# Patient Record
Sex: MALE | Race: BLACK OR AFRICAN AMERICAN | Employment: FULL TIME | URBAN - METROPOLITAN AREA
[De-identification: names, ages, dates, MRNs, and addresses within clinical notes are randomized per-mention and may not be internally consistent; named-entity substitution may affect disease eponyms.]

---

## 2017-03-15 ENCOUNTER — OFFICE VISIT (OUTPATIENT)
Dept: FAMILY MEDICINE CLINIC | Age: 47
End: 2017-03-15
Payer: COMMERCIAL

## 2017-03-15 VITALS
SYSTOLIC BLOOD PRESSURE: 114 MMHG | DIASTOLIC BLOOD PRESSURE: 80 MMHG | WEIGHT: 232 LBS | HEART RATE: 63 BPM | HEIGHT: 72 IN | BODY MASS INDEX: 31.42 KG/M2

## 2017-03-15 DIAGNOSIS — E66.09 NON MORBID OBESITY DUE TO EXCESS CALORIES: ICD-10-CM

## 2017-03-15 DIAGNOSIS — E78.2 MIXED DYSLIPIDEMIA: Primary | ICD-10-CM

## 2017-03-15 DIAGNOSIS — F17.201 TOBACCO USE DISORDER, MILD, IN SUSTAINED REMISSION: ICD-10-CM

## 2017-03-15 PROCEDURE — 4004F PT TOBACCO SCREEN RCVD TLK: CPT | Performed by: FAMILY MEDICINE

## 2017-03-15 PROCEDURE — G8484 FLU IMMUNIZE NO ADMIN: HCPCS | Performed by: FAMILY MEDICINE

## 2017-03-15 PROCEDURE — G8417 CALC BMI ABV UP PARAM F/U: HCPCS | Performed by: FAMILY MEDICINE

## 2017-03-15 PROCEDURE — G8427 DOCREV CUR MEDS BY ELIG CLIN: HCPCS | Performed by: FAMILY MEDICINE

## 2017-03-15 PROCEDURE — 99213 OFFICE O/P EST LOW 20 MIN: CPT | Performed by: FAMILY MEDICINE

## 2017-03-15 ASSESSMENT — ENCOUNTER SYMPTOMS
SORE THROAT: 0
NAUSEA: 0
DIARRHEA: 0
CONSTIPATION: 0
COUGH: 0
SHORTNESS OF BREATH: 0
ABDOMINAL PAIN: 0
CHEST TIGHTNESS: 0
RHINORRHEA: 0
BACK PAIN: 0
TROUBLE SWALLOWING: 0

## 2017-03-15 ASSESSMENT — PATIENT HEALTH QUESTIONNAIRE - PHQ9
SUM OF ALL RESPONSES TO PHQ9 QUESTIONS 1 & 2: 0
1. LITTLE INTEREST OR PLEASURE IN DOING THINGS: 0
SUM OF ALL RESPONSES TO PHQ QUESTIONS 1-9: 0
2. FEELING DOWN, DEPRESSED OR HOPELESS: 0

## 2017-06-21 ENCOUNTER — OFFICE VISIT (OUTPATIENT)
Dept: FAMILY MEDICINE CLINIC | Age: 47
End: 2017-06-21
Payer: COMMERCIAL

## 2017-06-21 VITALS
TEMPERATURE: 98.4 F | WEIGHT: 238.8 LBS | OXYGEN SATURATION: 97 % | HEIGHT: 72 IN | SYSTOLIC BLOOD PRESSURE: 130 MMHG | DIASTOLIC BLOOD PRESSURE: 70 MMHG | BODY MASS INDEX: 32.34 KG/M2

## 2017-06-21 DIAGNOSIS — J06.9 ACUTE URI: Primary | ICD-10-CM

## 2017-06-21 DIAGNOSIS — J01.90 ACUTE RHINOSINUSITIS: ICD-10-CM

## 2017-06-21 DIAGNOSIS — J20.9 ACUTE LARYNGOTRACHEOBRONCHITIS: ICD-10-CM

## 2017-06-21 PROCEDURE — 99213 OFFICE O/P EST LOW 20 MIN: CPT | Performed by: FAMILY MEDICINE

## 2017-06-21 PROCEDURE — 4004F PT TOBACCO SCREEN RCVD TLK: CPT | Performed by: FAMILY MEDICINE

## 2017-06-21 PROCEDURE — G8417 CALC BMI ABV UP PARAM F/U: HCPCS | Performed by: FAMILY MEDICINE

## 2017-06-21 PROCEDURE — G8427 DOCREV CUR MEDS BY ELIG CLIN: HCPCS | Performed by: FAMILY MEDICINE

## 2017-06-21 RX ORDER — PROMETHAZINE HYDROCHLORIDE AND CODEINE PHOSPHATE 6.25; 1 MG/5ML; MG/5ML
5 SYRUP ORAL EVERY 4 HOURS PRN
Qty: 120 ML | Refills: 0 | Status: SHIPPED | OUTPATIENT
Start: 2017-06-21 | End: 2017-06-28

## 2017-06-21 RX ORDER — AZITHROMYCIN 250 MG/1
250 TABLET, FILM COATED ORAL DAILY
Qty: 6 TABLET | Refills: 0 | Status: SHIPPED | OUTPATIENT
Start: 2017-06-21 | End: 2017-06-26

## 2017-06-21 ASSESSMENT — PATIENT HEALTH QUESTIONNAIRE - PHQ9
SUM OF ALL RESPONSES TO PHQ QUESTIONS 1-9: 0
SUM OF ALL RESPONSES TO PHQ9 QUESTIONS 1 & 2: 0
2. FEELING DOWN, DEPRESSED OR HOPELESS: 0
1. LITTLE INTEREST OR PLEASURE IN DOING THINGS: 0

## 2017-06-21 ASSESSMENT — ENCOUNTER SYMPTOMS
TROUBLE SWALLOWING: 0
ABDOMINAL PAIN: 0
SORE THROAT: 1
SINUS PRESSURE: 1
CHEST TIGHTNESS: 1
NAUSEA: 0
SHORTNESS OF BREATH: 1
RHINORRHEA: 1
CONSTIPATION: 0
DIARRHEA: 0
BACK PAIN: 0
COUGH: 1

## 2017-09-19 ENCOUNTER — OFFICE VISIT (OUTPATIENT)
Dept: FAMILY MEDICINE CLINIC | Age: 47
End: 2017-09-19
Payer: COMMERCIAL

## 2017-09-19 VITALS
SYSTOLIC BLOOD PRESSURE: 130 MMHG | HEART RATE: 58 BPM | WEIGHT: 235 LBS | HEIGHT: 72 IN | DIASTOLIC BLOOD PRESSURE: 88 MMHG | BODY MASS INDEX: 31.83 KG/M2

## 2017-09-19 DIAGNOSIS — E66.09 NON MORBID OBESITY DUE TO EXCESS CALORIES: ICD-10-CM

## 2017-09-19 DIAGNOSIS — E78.2 MIXED DYSLIPIDEMIA: Primary | ICD-10-CM

## 2017-09-19 DIAGNOSIS — D75.1 ERYTHROCYTOSIS: ICD-10-CM

## 2017-09-19 DIAGNOSIS — F17.201 TOBACCO USE DISORDER, MILD, IN SUSTAINED REMISSION: ICD-10-CM

## 2017-09-19 DIAGNOSIS — R73.9 HYPERGLYCEMIA: ICD-10-CM

## 2017-09-19 LAB — HBA1C MFR BLD: 5.9 %

## 2017-09-19 PROCEDURE — 83036 HEMOGLOBIN GLYCOSYLATED A1C: CPT | Performed by: FAMILY MEDICINE

## 2017-09-19 PROCEDURE — 99213 OFFICE O/P EST LOW 20 MIN: CPT | Performed by: FAMILY MEDICINE

## 2017-09-19 PROCEDURE — G8427 DOCREV CUR MEDS BY ELIG CLIN: HCPCS | Performed by: FAMILY MEDICINE

## 2017-09-19 PROCEDURE — G8417 CALC BMI ABV UP PARAM F/U: HCPCS | Performed by: FAMILY MEDICINE

## 2017-09-19 PROCEDURE — 4004F PT TOBACCO SCREEN RCVD TLK: CPT | Performed by: FAMILY MEDICINE

## 2017-09-19 ASSESSMENT — ENCOUNTER SYMPTOMS
DIARRHEA: 0
NAUSEA: 0
TROUBLE SWALLOWING: 0
ABDOMINAL PAIN: 0
COUGH: 0
CONSTIPATION: 0
CHEST TIGHTNESS: 0
SHORTNESS OF BREATH: 0
SORE THROAT: 0
RHINORRHEA: 0
BACK PAIN: 0

## 2018-02-05 DIAGNOSIS — E78.2 MIXED DYSLIPIDEMIA: ICD-10-CM

## 2018-02-05 DIAGNOSIS — D75.1 ERYTHROCYTOSIS: ICD-10-CM

## 2018-02-05 LAB
ALBUMIN SERPL-MCNC: 3.9 G/DL
ALP BLD-CCNC: 77 U/L
ALT SERPL-CCNC: 46 U/L
ANION GAP SERPL CALCULATED.3IONS-SCNC: NORMAL MMOL/L
AST SERPL-CCNC: 26 U/L
BASOPHILS ABSOLUTE: 0 /ΜL
BASOPHILS RELATIVE PERCENT: 0 %
BILIRUB SERPL-MCNC: 0.9 MG/DL (ref 0.1–1.4)
BUN BLDV-MCNC: 13 MG/DL
CALCIUM SERPL-MCNC: 9.1 MG/DL
CHLORIDE BLD-SCNC: 104 MMOL/L
CHOLESTEROL, TOTAL: 232 MG/DL
CHOLESTEROL/HDL RATIO: 4.5
CO2: 28 MMOL/L
CREAT SERPL-MCNC: 0.86 MG/DL
EOSINOPHILS ABSOLUTE: 0 /ΜL
EOSINOPHILS RELATIVE PERCENT: 0 %
GFR CALCULATED: 115.3
GLUCOSE BLD-MCNC: 95 MG/DL
HCT VFR BLD CALC: 46.1 % (ref 41–53)
HDLC SERPL-MCNC: 51 MG/DL (ref 35–70)
HEMOGLOBIN: 14.9 G/DL (ref 13.5–17.5)
LDL CHOLESTEROL CALCULATED: 156 MG/DL (ref 0–160)
LYMPHOCYTES ABSOLUTE: 1.32 /ΜL
LYMPHOCYTES RELATIVE PERCENT: 22 %
MCH RBC QN AUTO: 25.2 PG
MCHC RBC AUTO-ENTMCNC: 32.3 G/DL
MCV RBC AUTO: 78 FL
MONOCYTES ABSOLUTE: 0.48 /ΜL
MONOCYTES RELATIVE PERCENT: 8 %
NEUTROPHILS ABSOLUTE: 4.19 /ΜL
NEUTROPHILS RELATIVE PERCENT: ABNORMAL %
PDW BLD-RTO: ABNORMAL %
PLATELET # BLD: 224 K/ΜL
PMV BLD AUTO: ABNORMAL FL
POTASSIUM SERPL-SCNC: 4.5 MMOL/L
RBC # BLD: 5.91 10^6/ΜL
SODIUM BLD-SCNC: 142 MMOL/L
TOTAL PROTEIN: 7.2
TRIGL SERPL-MCNC: 125 MG/DL
VLDLC SERPL CALC-MCNC: 25 MG/DL
WBC # BLD: 5.99 10^3/ML

## 2018-04-13 ENCOUNTER — OFFICE VISIT (OUTPATIENT)
Dept: FAMILY MEDICINE CLINIC | Age: 48
End: 2018-04-13
Payer: COMMERCIAL

## 2018-04-13 VITALS
BODY MASS INDEX: 32.75 KG/M2 | WEIGHT: 241.8 LBS | DIASTOLIC BLOOD PRESSURE: 78 MMHG | HEART RATE: 61 BPM | SYSTOLIC BLOOD PRESSURE: 127 MMHG | OXYGEN SATURATION: 95 % | HEIGHT: 72 IN

## 2018-04-13 DIAGNOSIS — K21.9 GASTROESOPHAGEAL REFLUX DISEASE, ESOPHAGITIS PRESENCE NOT SPECIFIED: Primary | ICD-10-CM

## 2018-04-13 DIAGNOSIS — E66.09 CLASS 1 OBESITY DUE TO EXCESS CALORIES WITHOUT SERIOUS COMORBIDITY WITH BODY MASS INDEX (BMI) OF 32.0 TO 32.9 IN ADULT: ICD-10-CM

## 2018-04-13 DIAGNOSIS — E78.2 MIXED DYSLIPIDEMIA: ICD-10-CM

## 2018-04-13 PROCEDURE — 4004F PT TOBACCO SCREEN RCVD TLK: CPT | Performed by: PHYSICIAN ASSISTANT

## 2018-04-13 PROCEDURE — G8427 DOCREV CUR MEDS BY ELIG CLIN: HCPCS | Performed by: PHYSICIAN ASSISTANT

## 2018-04-13 PROCEDURE — 99213 OFFICE O/P EST LOW 20 MIN: CPT | Performed by: PHYSICIAN ASSISTANT

## 2018-04-13 PROCEDURE — G8417 CALC BMI ABV UP PARAM F/U: HCPCS | Performed by: PHYSICIAN ASSISTANT

## 2018-04-13 RX ORDER — OMEPRAZOLE 20 MG/1
20 CAPSULE, DELAYED RELEASE ORAL DAILY
Qty: 30 CAPSULE | Refills: 1 | Status: SHIPPED | OUTPATIENT
Start: 2018-04-13

## 2018-04-13 ASSESSMENT — ENCOUNTER SYMPTOMS
ABDOMINAL PAIN: 1
VOMITING: 0
SHORTNESS OF BREATH: 0
NAUSEA: 0
BACK PAIN: 0
CONSTIPATION: 0
DIARRHEA: 0

## 2018-04-14 PROBLEM — J06.9 ACUTE URI: Status: RESOLVED | Noted: 2017-06-21 | Resolved: 2018-04-14

## 2018-04-14 PROBLEM — J01.90 ACUTE RHINOSINUSITIS: Status: RESOLVED | Noted: 2017-06-21 | Resolved: 2018-04-14

## 2018-04-14 PROBLEM — J20.9 ACUTE LARYNGOTRACHEOBRONCHITIS: Status: RESOLVED | Noted: 2017-06-21 | Resolved: 2018-04-14

## 2018-09-21 ENCOUNTER — OFFICE VISIT (OUTPATIENT)
Dept: FAMILY MEDICINE CLINIC | Age: 48
End: 2018-09-21
Payer: COMMERCIAL

## 2018-09-21 VITALS
WEIGHT: 234 LBS | HEIGHT: 72 IN | OXYGEN SATURATION: 96 % | BODY MASS INDEX: 31.69 KG/M2 | SYSTOLIC BLOOD PRESSURE: 106 MMHG | DIASTOLIC BLOOD PRESSURE: 66 MMHG | HEART RATE: 56 BPM

## 2018-09-21 DIAGNOSIS — E11.9 TYPE 2 DIABETES MELLITUS WITHOUT COMPLICATION, WITHOUT LONG-TERM CURRENT USE OF INSULIN (HCC): ICD-10-CM

## 2018-09-21 DIAGNOSIS — E66.09 CLASS 1 OBESITY DUE TO EXCESS CALORIES WITHOUT SERIOUS COMORBIDITY WITH BODY MASS INDEX (BMI) OF 31.0 TO 31.9 IN ADULT: ICD-10-CM

## 2018-09-21 DIAGNOSIS — Z00.00 ANNUAL PHYSICAL EXAM: Primary | ICD-10-CM

## 2018-09-21 DIAGNOSIS — E78.2 MIXED DYSLIPIDEMIA: ICD-10-CM

## 2018-09-21 DIAGNOSIS — K21.9 GASTROESOPHAGEAL REFLUX DISEASE WITHOUT ESOPHAGITIS: ICD-10-CM

## 2018-09-21 LAB — HBA1C MFR BLD: 6.6 %

## 2018-09-21 PROCEDURE — 83036 HEMOGLOBIN GLYCOSYLATED A1C: CPT | Performed by: PHYSICIAN ASSISTANT

## 2018-09-21 PROCEDURE — 99396 PREV VISIT EST AGE 40-64: CPT | Performed by: PHYSICIAN ASSISTANT

## 2018-09-21 ASSESSMENT — ENCOUNTER SYMPTOMS
CONSTIPATION: 0
HEMATOCHEZIA: 0
ABDOMINAL PAIN: 0
DIARRHEA: 1

## 2018-09-21 NOTE — PROGRESS NOTES
[address]  [place]  [address]  [address]  Dept: [phone]  Dept Fax: [phone]    Office Progress/Follow Up Note  Date of patient's visit: 9/21/2018  Patient's Name:  Jaycee Sterling Sr YOB: 1970            Patient Care Team:  Hawk Tolentino PA-C as PCP - General (Physician Assistant)  ================================================================    REASON FOR VISIT/CHIEF COMPLAINT:  Follow-up (5 month follow up)    HISTORY OF PRESENTING ILLNESS:  History was obtained from: patient. Mag Vigil is a 50 y.o. is here for follow-up for GERD, HLD, physical, complaining of GI problem. Patient states she is compliant with all medications. Patient has noticed increased bowel movement daily, increased from twice daily to 4-5 times daily. Patient denies any family history of GI diseases or problems. Patient voice is low concern of increased bowel movement. Discussed increased bowel movements in depth with patient, patient would like to make changes in diet, will contact office if he feels he needs to see GI. Discussed GERD, HLD and up the patient, stable. Patient A1c in office is 6.6. Discussed diabetes, A1c results in depth with patient, patient will like to manage his diabetes with lifestyle changes, discussed diet and weight management in depth with patient. Informed patient we will follow up and recheck A1c in 3 months and if still elevated he will be started on medication, patient voices understanding. Patient blood pressure stable in office. Patient had lost 7 pounds. Discussed weight loss in depth with patient, encouraged patient to make changes in diet and increase physical activity by exercising. Labs reviewed, patient did not have lab work completed, reprinted lab orders and provided to patient, instructed patient have done within the next few weeks, patient voices understanding. Health maintenance reviewed. Medications reviewed. Informed patient there will be no changes to medications for GERD, continue current treatments    GI Problem    The primary symptoms include diarrhea. Primary symptoms do not include fever, abdominal pain, nausea, vomiting, melena, hematochezia, dysuria or myalgias. The illness does not include chills, constipation or back pain. Patient Active Problem List   Diagnosis    Physical exam, annual    Class 1 obesity due to excess calories without serious comorbidity with body mass index (BMI) of 32.0 to 32.9 in adult    Tobacco use disorder    Astigmatism    Presbyopia    Mixed dyslipidemia    Hyperglycemia    Tobacco use disorder, mild, in sustained remission       Health Maintenance Due   Topic Date Due    HIV screen  02/04/1985       No Known Allergies      Current Outpatient Prescriptions   Medication Sig Dispense Refill    omeprazole (PRILOSEC) 20 MG delayed release capsule Take 1 capsule by mouth daily 30 capsule 1     No current facility-administered medications for this visit. Social History   Substance Use Topics    Smoking status: Former Smoker     Types: Cigars    Smokeless tobacco: Never Used      Comment: SMOKES CIGARS    Alcohol use 1.2 oz/week     2 Cans of beer per week      Comment: SOCIAL       History reviewed. No pertinent family history. REVIEW OF SYSTEMS:  Review of Systems   Constitutional: Negative for chills and fever. HENT: Negative for congestion, hearing loss and sore throat. Eyes: Negative for blurred vision and double vision. Respiratory: Negative for cough, sputum production and shortness of breath. Cardiovascular: Negative for chest pain, palpitations and leg swelling. Gastrointestinal: Positive for diarrhea. Negative for abdominal pain, constipation, hematochezia, melena, nausea and vomiting. Genitourinary: Negative for dysuria, frequency and urgency.    Musculoskeletal: Negative for back pain, joint pain, myalgias and neck pain. Neurological: Negative for dizziness, sensory change and headaches. PHYSICAL EXAM:  Vitals:    09/21/18 0910   BP: 106/66   Pulse: 56   SpO2: 96%   Weight: 234 lb (106.1 kg)   Height: 6' (1.829 m)     BP Readings from Last 3 Encounters:   09/21/18 106/66   04/13/18 127/78   09/19/17 130/88        Physical Exam   Constitutional: He is oriented to person, place, and time and well-developed, well-nourished, and in no distress. HENT:   Head: Normocephalic and atraumatic. Mouth/Throat: Oropharynx is clear and moist.   Eyes: Pupils are equal, round, and reactive to light. Conjunctivae and lids are normal.   Neck: Normal range of motion. Cardiovascular: Regular rhythm and normal heart sounds. Bradycardia present. Pulmonary/Chest: Effort normal and breath sounds normal.   Abdominal: Soft. He exhibits no mass. There is no tenderness. Musculoskeletal: He exhibits no edema or tenderness. Neurological: He is alert and oriented to person, place, and time. Gait normal.   Skin: Skin is warm and dry. Psychiatric: Mood and affect normal.   Vitals reviewed. DIAGNOSTIC FINDINGS:  CBC:  Lab Results   Component Value Date    WBC 5.99 01/31/2018    HGB 14.9 01/31/2018     01/31/2018       BMP:    Lab Results   Component Value Date     01/31/2018    K 4.5 01/31/2018     01/31/2018    CO2 28 01/31/2018    BUN 13 01/31/2018    CREATININE 0.86 01/31/2018    GLUCOSE 95 01/31/2018       HEMOGLOBIN A1C:   Lab Results   Component Value Date    LABA1C 6.6 09/21/2018       FASTING LIPID PANEL:  Lab Results   Component Value Date    CHOL 232 (H) 01/31/2018    HDL 51 01/31/2018    TRIG 125 01/31/2018       ASSESSMENT AND PLAN:  Roger Zimmerman was seen today for follow-up.     Diagnoses and all orders for this visit:    Annual physical exam    Type 2 diabetes mellitus without complication, without long-term current use of insulin ([facility] [address]    Mixed dyslipidemia  -     POCT glycosylated hemoglobin (Hb A1C)    Gastroesophageal reflux disease without esophagitis    Class 1 obesity due to excess calories without serious comorbidity with body mass index (BMI) of 31.0 to 31.9 in adult      FOLLOW UP AND INSTRUCTIONS:  Return in about 3 months (around 12/21/2018) for HTN, HLD, GERD, Obesity, Lab Review. · Stacey AGUILAR received counseling on the following healthy behaviors: nutrition and exercise    · Discussed use, benefit, and side effects of prescribed medications. Barriers to medication compliance addressed. All patient questions answered. Pt voiced understanding. · Patient given educational materials - see patient instructions    Electronically signed by Brigida Martinez PA-C on 9/21/18 at 9:22 AM    This note is created with the assistance of a speech-recognition program. While intending to generate a document that actually reflects the content of the visit, the document can still have some mistakes which may not have been identified and corrected by editing.

## 2018-09-22 ASSESSMENT — ENCOUNTER SYMPTOMS
SHORTNESS OF BREATH: 0
VOMITING: 0
SPUTUM PRODUCTION: 0
COUGH: 0
BACK PAIN: 0
DOUBLE VISION: 0
NAUSEA: 0
SORE THROAT: 0
BLURRED VISION: 0

## 2018-09-24 LAB
CHOLESTEROL, TOTAL: 230 MG/DL
CHOLESTEROL/HDL RATIO: 4.6
HDLC SERPL-MCNC: 50 MG/DL (ref 35–70)
LDL CHOLESTEROL CALCULATED: 163 MG/DL (ref 0–160)
TRIGL SERPL-MCNC: 87 MG/DL
VLDLC SERPL CALC-MCNC: 17 MG/DL

## 2018-09-26 ENCOUNTER — TELEPHONE (OUTPATIENT)
Dept: FAMILY MEDICINE CLINIC | Age: 48
End: 2018-09-26

## 2018-09-26 DIAGNOSIS — E78.2 MIXED HYPERLIPIDEMIA: Primary | ICD-10-CM

## 2018-09-26 DIAGNOSIS — E78.2 MIXED DYSLIPIDEMIA: ICD-10-CM

## 2018-09-26 RX ORDER — ATORVASTATIN CALCIUM 40 MG/1
40 TABLET, FILM COATED ORAL NIGHTLY
Qty: 30 TABLET | Refills: 2 | Status: SHIPPED | OUTPATIENT
Start: 2018-09-26 | End: 2019-01-18 | Stop reason: SDUPTHER

## 2018-10-26 ENCOUNTER — OFFICE VISIT (OUTPATIENT)
Dept: FAMILY MEDICINE CLINIC | Age: 48
End: 2018-10-26
Payer: COMMERCIAL

## 2018-10-26 VITALS
WEIGHT: 238.8 LBS | HEART RATE: 97 BPM | SYSTOLIC BLOOD PRESSURE: 123 MMHG | DIASTOLIC BLOOD PRESSURE: 67 MMHG | RESPIRATION RATE: 20 BRPM | TEMPERATURE: 98.1 F | HEIGHT: 72 IN | BODY MASS INDEX: 32.34 KG/M2 | OXYGEN SATURATION: 98 %

## 2018-10-26 DIAGNOSIS — E66.09 CLASS 1 OBESITY DUE TO EXCESS CALORIES WITHOUT SERIOUS COMORBIDITY WITH BODY MASS INDEX (BMI) OF 32.0 TO 32.9 IN ADULT: ICD-10-CM

## 2018-10-26 DIAGNOSIS — M54.42 ACUTE LEFT-SIDED LOW BACK PAIN WITH LEFT-SIDED SCIATICA: Primary | ICD-10-CM

## 2018-10-26 PROCEDURE — G8417 CALC BMI ABV UP PARAM F/U: HCPCS | Performed by: PHYSICIAN ASSISTANT

## 2018-10-26 PROCEDURE — G8484 FLU IMMUNIZE NO ADMIN: HCPCS | Performed by: PHYSICIAN ASSISTANT

## 2018-10-26 PROCEDURE — G8427 DOCREV CUR MEDS BY ELIG CLIN: HCPCS | Performed by: PHYSICIAN ASSISTANT

## 2018-10-26 PROCEDURE — 99213 OFFICE O/P EST LOW 20 MIN: CPT | Performed by: PHYSICIAN ASSISTANT

## 2018-10-26 PROCEDURE — 1036F TOBACCO NON-USER: CPT | Performed by: PHYSICIAN ASSISTANT

## 2018-10-26 RX ORDER — TIZANIDINE 4 MG/1
4 TABLET ORAL 2 TIMES DAILY PRN
Qty: 60 TABLET | Refills: 0 | Status: SHIPPED | OUTPATIENT
Start: 2018-10-26

## 2018-10-26 RX ORDER — IBUPROFEN 800 MG/1
800 TABLET ORAL EVERY 6 HOURS PRN
Qty: 120 TABLET | Refills: 0 | Status: SHIPPED | OUTPATIENT
Start: 2018-10-26

## 2018-10-26 ASSESSMENT — ENCOUNTER SYMPTOMS
BACK PAIN: 1
BOWEL INCONTINENCE: 0

## 2018-10-26 ASSESSMENT — PATIENT HEALTH QUESTIONNAIRE - PHQ9
SUM OF ALL RESPONSES TO PHQ9 QUESTIONS 1 & 2: 0
SUM OF ALL RESPONSES TO PHQ QUESTIONS 1-9: 0
1. LITTLE INTEREST OR PLEASURE IN DOING THINGS: 0
2. FEELING DOWN, DEPRESSED OR HOPELESS: 0
SUM OF ALL RESPONSES TO PHQ QUESTIONS 1-9: 0

## 2018-10-26 NOTE — PROGRESS NOTES
[address]  [place]  [address] [address]  Dept: [phone]  Dept Fax: [phone]    Office Progress/Follow Up Note  Date of patient's visit: 10/26/2018  Patient's Name:  Nesha Bowser Sr YOB: 1970            Patient Care Team:  Shania Hammond PA-C as PCP - General (Physician Assistant)  ================================================================    REASON FOR VISIT/CHIEF COMPLAINT:  Back Pain (x 4 days, pain radiating down left leg)    HISTORY OFPRESENTING ILLNESS:  History was obtained from: patient. Ja Patricio is a 50 y.o. is here complaining of back pain. Patient denies any injury to his lower back. Patient states pain been present for the last 7 days. Patient states he was over as father's house in use his father's inverter, mild improvement for lower back pain. Discussed lower back pain, medications and treatments in depth with patient, patient was provided lower back exercises and stretches, informed patient he will be sent for lumbar spine x-ray, informed patient he will be prescribed pain medication and muscle relaxer, informed patient if lumbar spine x-ray is negative patient will be referred to PT, patient prefers PDT at [facility] location. Patient blood pressure stable in office. Patient weight is stable. Discussed weight loss and that with patient, encouraged patient to make changes in diet and increased physical activity by exercising, informed patient that weight loss will help improve lower back pain. Labs reviewed. Health maintenance reviewed. Medications reviewed, prescribed ibuprofen 800 mg every 6 hours when necessary, Zanaflex 4 mg twice a day when necessary. Back Pain    This is a new problem. The current episode started in the past 7 days. The pain is present in the lumbar spine. The pain radiates to the right thigh. The pain is at a severity of 3/10.  The symptoms are aggravated by twisting and incontinence, dysuria, frequency and urgency. Musculoskeletal: Positive for back pain. Negative for myalgias and neck pain. Neurological: Negative for tingling, weakness, numbness, headaches and paresthesias. PHYSICAL EXAM:  Vitals:    10/26/18 1059   BP: 123/67   Site: Left Upper Arm   Position: Sitting   Cuff Size: Large Adult   Pulse: 97   Resp: 20   Temp: 98.1 °F (36.7 °C)   TempSrc: Oral   SpO2: 98%   Weight: 238 lb 12.8 oz (108.3 kg)   Height: 6' (1.829 m)     BP Readings from Last 3 Encounters:   10/26/18 123/67   09/21/18 106/66   04/13/18 127/78        Physical Exam   Constitutional: He is oriented to person, place, and time. Vital signs are normal.   HENT:   Head: Normocephalic and atraumatic. Eyes: Pupils are equal, round, and reactive to light. Cardiovascular: Normal rate, regular rhythm and normal heart sounds. Pulmonary/Chest: Effort normal and breath sounds normal.   Abdominal: Soft. He exhibits no mass. There is no tenderness. Musculoskeletal:        Lumbar back: He exhibits tenderness, bony tenderness and pain. Neurological: He is alert and oriented to person, place, and time. Skin: Skin is warm and dry. Vitals reviewed. DIAGNOSTIC FINDINGS:  CBC:  Lab Results   Component Value Date    WBC 5.99 01/31/2018    HGB 14.9 01/31/2018     01/31/2018       BMP:    Lab Results   Component Value Date     01/31/2018    K 4.5 01/31/2018     01/31/2018    CO2 28 01/31/2018    BUN 13 01/31/2018    CREATININE 0.86 01/31/2018    GLUCOSE 95 01/31/2018       HEMOGLOBIN A1C:   Lab Results   Component Value Date    LABA1C 6.6 09/21/2018       FASTING LIPID [email or web address]    ASSESSMENT AND PLAN:  Jose AGUILAR was seen today for back pain. Diagnoses and all orders for this visit:    Acute left-sided low back pain with left-sided sciatica  -     XR LUMBAR SPINE (2-3 VIEWS); Future  -     ibuprofen (ADVIL;MOTRIN) 800 MG tablet;  Take 1

## 2018-10-27 ASSESSMENT — ENCOUNTER SYMPTOMS
COUGH: 0
CONSTIPATION: 0
DIARRHEA: 0
NAUSEA: 0
VOMITING: 0
SHORTNESS OF BREATH: 0

## 2018-10-30 DIAGNOSIS — M54.42 ACUTE LEFT-SIDED LOW BACK PAIN WITH LEFT-SIDED SCIATICA: ICD-10-CM

## 2018-10-31 ENCOUNTER — TELEPHONE (OUTPATIENT)
Dept: FAMILY MEDICINE CLINIC | Age: 48
End: 2018-10-31

## 2018-10-31 DIAGNOSIS — M54.50 BILATERAL LOW BACK PAIN WITHOUT SCIATICA, UNSPECIFIED CHRONICITY: Primary | ICD-10-CM

## 2018-11-09 ENCOUNTER — HOSPITAL ENCOUNTER (OUTPATIENT)
Dept: PHYSICAL THERAPY | Facility: CLINIC | Age: 48
Setting detail: THERAPIES SERIES
Discharge: HOME | End: 2018-11-09
Payer: COMMERCIAL

## 2018-11-09 NOTE — FLOWSHEET NOTE
[] [address]  [address]    [phone]  F: [phone] [x] [address]  [facility] [address]   [address]  P: [phone]  F: [phone] [] [place]  [address] [facility]  P: [phone]  F: [phone] [] [address] [facility]   [address]   [place]: [phone]  F: [phone] [] [place] [address] [facility] [address]  [place]: [phone]   F: [phone]      Physical Therapy Cancel/No Show note    Date: 2018  Patient: Paolo Silverio Al  : 1970  MRN: [id number]    Cancels/No Shows to date:     For today's appointment patient:  [x]  Cancelled  [x]  Rescheduled appointment for 18 at 8:00am  []  No-show     Reason given by patient:  []  Patient ill  []  Conflicting appointment  []  No transportation    [x]  Conflict with work  []  No reason given  []  Weather related  []  Other:      Comments:      []  Next appointment was confirmed    Electronically signed by: Young Jasmine PT

## 2018-11-14 ENCOUNTER — HOSPITAL ENCOUNTER (OUTPATIENT)
Dept: PHYSICAL THERAPY | Facility: CLINIC | Age: 48
Setting detail: THERAPIES SERIES
Discharge: HOME | End: 2018-11-14
Payer: COMMERCIAL

## 2018-11-14 PROCEDURE — 97162 PT EVAL MOD COMPLEX 30 MIN: CPT

## 2018-11-14 PROCEDURE — 97530 THERAPEUTIC ACTIVITIES: CPT

## 2018-11-14 NOTE — PLAN OF CARE
bed  [x] Postural Deviations: pelvic/sacral anomalies; increased lumbar lordosis; pes planus  [] Gait Deviations  [x] Other: mm spasms                            STG: (to be met in 6 treatments)  1. ? Pain: below 2-3/10 pain with infrequent LE symptoms and less reliance on medications  2. ? ROM: tolerate hip flexor stretches, general lumbar stretches to help decrease # of spasms pt experiences and to decrease stress to lumbar spine so that he is able to lie in bed without fear of spasm  3. ? Strength: tolerate aggressive spinal stabilization program to address core endurance issues without increase in pain so that the pt is able to work without fear of increased pain  4. ? Function: report ability to sleep in bed with meds if needed  5. Independent with Home Exercise Program  6. Minimize ischial slip and sacral positions     LTG: (to be met in 12  treatments)  1. Pain below 2/10 with minimal to 0 LE symptoms  2. Be (I) with proper body mechanics and use of lumbar roll for proper support  3. Good spinal and pelvic alignment to decrease amount of spasms and LE pain  4.  Excellent core strength for improved posture and decreased spasms/pain     Patient goals:  \"find a way to stop the demobilization\"      Treatment Plan:  [x] Therapeutic Exercise             [] Modalities:  [x] Therapeutic Activity               [] Ultrasound                  [x] Electrical Stimulation  [] Gait Training                          []Massage         [x] Lumbar/Cervical Traction  [] Neuromuscular Re-education            [x] hotpack          [] Iontophoresis: 4 mg/mL  [x] Instruction in HEP                                                                       Dexamethasone Sodium  [x] Manual Therapy                                                                          Phosphate 40-80 mAmin  [] Aquatic Therapy                                 [x] Dry Needling  [] Other:     []  Medication allergies reviewed for use of

## 2018-11-20 ENCOUNTER — HOSPITAL ENCOUNTER (OUTPATIENT)
Dept: PHYSICAL THERAPY | Facility: CLINIC | Age: 48
Setting detail: THERAPIES SERIES
Discharge: HOME | End: 2018-11-20
Payer: COMMERCIAL

## 2018-11-20 PROCEDURE — 97110 THERAPEUTIC EXERCISES: CPT

## 2018-11-20 NOTE — FLOWSHEET NOTE
[] Ernesto [facility]       Outpatient Physical        Therapy       [address]       Phone: [phone]       Fax: [phone] [x] [facility] at [address] [facility]       Phone: [phone]       Fax: [phone] [] Jamie. [address] [place] [address]   Phone: [phone]   Fax:  [phone]     Physical Therapy Daily Treatment Note    Date:  2018  Patient Name:  Porter Bhatia    :  1970  MRN: [id number]  Physician: Amol Venegas PA-C                                     Insurance: [facility] [place] [facility]  Medical Diagnosis: bilateral LBP without sciatica                  Rehab Codes: M54.5, M79.1; M62.830; M79.605; R29.3; M40.46; R29.898  Onset Date: 10/20/18               Next 's appt. :   Visit# / total visits: 2/12   Cancels/No Shows: 1/0    Subjective:    Pain:  [] Yes  [x] No  Location: LBP   Pain Rating: (0-10 scale) 0/10  Pain altered Tx:  [x] No  [] Yes  Action:    Comments: pt denies pain at this time.     Objective:  Modalities:   Precautions: severe pes planus with previous surgery; poor ankle movement  Exercises:  Exercise Reps/ Time Weight/ Level Comments   Scifit 8' ML3               Supine B Hip flexor stretch 3x ea 30\"    R leg pull X     Posterior pelvic tilts 10x 10\"    Lstab march  15x ea A    SLR 15x ea A    SKTC 5x ea 5\"    LTR 5x ea 5\"    HS stretch 3x ea 30\"          Sierra rotary torso 15x ea 4pl          Seated stability ball     Large red   march 15x ea A     Alt UE/LE 15x ea A     LAQ 15x ea A    Other:  Therapeutic activities: instructed in use of lumbar roll; shown 2 different types- reviewed 18     Specific Instructions for next treatment: hip flexor stretches; aggressive core stability exercises; R leg pull or L ischium push; MFR lumbar spine/sacrum if needed; lumbar traction (I); DN (once approved- will need to fill out consent)      Treatment Charges: Mins Units

## 2018-11-27 ENCOUNTER — HOSPITAL ENCOUNTER (OUTPATIENT)
Dept: PHYSICAL THERAPY | Facility: CLINIC | Age: 48
Setting detail: THERAPIES SERIES
Discharge: HOME | End: 2018-11-27
Payer: COMMERCIAL

## 2018-11-27 PROCEDURE — 97110 THERAPEUTIC EXERCISES: CPT

## 2018-11-27 NOTE — FLOWSHEET NOTE
[] Aleksandar Borden       Outpatient Physical        Therapy       [address]       Phone: [phone]       Fax: [phone] [x] [facility] at [address] [facility]       Phone: [phone]       Fax: [phone] [] Jamie. [facility]  [address] [place] [address]   Phone: [phone]   Fax:  [phone]     Physical Therapy Daily Treatment Note    Date:  2018  Patient Name:  Hayde Farrar    :  1970  MRN: [id number]  Physician: Caryl Carrero PA-C                                     Insurance: [facility]  Medical Diagnosis: bilateral LBP without sciatica                  Rehab Codes: M54.5, M79.1; M62.830; M79.605; R29.3; M40.46; R29.898  Onset Date: 10/20/18               Next 's appt. :   Visit# / total visits: 3/12   Cancels/No Shows: 1/0    Subjective:    Pain:  [] Yes  [x] No  Location: LBP   Pain Rating: (0-10 scale) 0/10  Pain altered Tx:  [x] No  [] Yes  Action:    Comments: pt denies pain in the lower back at this time, however Pt states that he has some stiffness in the upper back(rhomboid muscles area)     Objective:  Modalities:   Precautions: severe pes planus with previous surgery; poor ankle movement  Exercises:  Exercise Reps/ Time Weight/ Level Comments   Scifit 10' ML3           SUPINE          Supine B Hip flexor stretch 3x ea 30\" Leg off the table   R leg pull X     Posterior pelvic tilts 10x 10\"    Lstab   15x ea 2# Added weight 18   SLR 15x ea 2# Added weight 18   SKTC 5x ea 5\"    LTR 5x ea 5\"    HS stretch 3x ea 30\"          Sierra rotary torso 15x ea 4pl          Seated stability ball    A Large blue    march 15x ea      Alt UE/LE 15x ea     LAQ  15xea           PRONE on stability ball   Added 18   Alt UE 10xea     Alt LE 10x ea      Alt UE/LE 10xea     Other:  Therapeutic activities: instructed in use of lumbar roll; shown 2 different types- reviewed Demonstrates understanding. [] Needs review. [x] Demonstrates/verbalizes HEP/Ed previously given. Plan: [x] Continue per plan of care.    [] Other:      Time In: 5:20 pm              Time Out: 6:10 pm     Electronically signed by:  Corey Danielson PTA

## 2018-11-29 ENCOUNTER — HOSPITAL ENCOUNTER (OUTPATIENT)
Dept: PHYSICAL THERAPY | Facility: CLINIC | Age: 48
Setting detail: THERAPIES SERIES
Discharge: HOME | End: 2018-11-29
Payer: COMMERCIAL

## 2018-11-29 PROCEDURE — 97140 MANUAL THERAPY 1/> REGIONS: CPT

## 2018-11-29 PROCEDURE — 97110 THERAPEUTIC EXERCISES: CPT

## 2018-11-29 NOTE — FLOWSHEET NOTE
support  3. Good spinal and pelvic alignment to decrease amount of spasms and LE pain  4. Excellent core strength for improved posture and decreased spasms/pain     Patient goals:  \"find a way to stop the demobilization\"    Pt. Education:  [x] Yes  [] No  [x] Reviewed Prior HEP/Ed  Method of Education: [x] Verbal  [x] Demo: clinic progression  [] Written  Comprehension of Education:  [x] Verbalizes understanding. [x] Demonstrates understanding. [x] Needs review. [x] Demonstrates/verbalizes HEP/Ed previously given. Plan: [x] Continue per plan of care.    [] Other:      Time In: 6:30 pm              Time Out: 7:15 pm     Electronically signed by:  Malina Pradhan PT

## 2018-12-05 ENCOUNTER — HOSPITAL ENCOUNTER (OUTPATIENT)
Dept: PHYSICAL THERAPY | Facility: CLINIC | Age: 48
Setting detail: THERAPIES SERIES
Discharge: HOME | End: 2018-12-05
Payer: COMMERCIAL

## 2018-12-05 PROCEDURE — 97110 THERAPEUTIC EXERCISES: CPT

## 2018-12-07 ENCOUNTER — HOSPITAL ENCOUNTER (OUTPATIENT)
Dept: PHYSICAL THERAPY | Facility: CLINIC | Age: 48
Setting detail: THERAPIES SERIES
Discharge: HOME | End: 2018-12-07
Payer: COMMERCIAL

## 2018-12-07 PROCEDURE — 97110 THERAPEUTIC EXERCISES: CPT

## 2018-12-07 NOTE — FLOWSHEET NOTE
report ability to sleep in bed with meds if needed MET with out pain meds 12/7  5. Independent with Home Exercise Program MET 12/7  6. Minimize ischial slip and sacral positions     LTG: (to be met in 12  treatments)  1. Pain below 2/10 with minimal to 0 LE symptoms  2. Be (I) with proper body mechanics and use of lumbar roll for proper support  3. Good spinal and pelvic alignment to decrease amount of spasms and LE pain  4. Excellent core strength for improved posture and decreased spasms/pain     Patient goals:  \"find a way to stop the demobilization\"    Pt. Education:  [x] Yes  [] No  [x] Reviewed Prior HEP/Ed  Method of Education: [x] Verbal  [x] Demo: clinic progression  [] Written  Comprehension of Education:  [x] Verbalizes understanding. [x] Demonstrates understanding. [] Needs review. [x] Demonstrates/verbalizes HEP/Ed previously given. Plan: [x] Continue per plan of care.    [] Other:      Time In: 10:25 am              Time Out: 11:30 am     Electronically signed by:  Latricia Pedro PTA

## 2018-12-10 ENCOUNTER — HOSPITAL ENCOUNTER (OUTPATIENT)
Dept: PHYSICAL THERAPY | Facility: CLINIC | Age: 48
Setting detail: THERAPIES SERIES
Discharge: HOME | End: 2018-12-10
Payer: COMMERCIAL

## 2018-12-10 PROCEDURE — 97110 THERAPEUTIC EXERCISES: CPT

## 2018-12-10 NOTE — FLOWSHEET NOTE
[] Taryn Prima       Outpatient Physical        Therapy       [address]       Phone: [phone]       Fax: [phone] [x] [facility] at [address]       Phone: [phone]       Fax: [phone] [] [facility]. [address] [place] [address] Promotion  [address] [place] [address]   Phone: [phone]   Fax:  [phone]     Physical Therapy Daily Treatment Note    Date:  12/10/2018  Patient Name:  Jennifer Desai    :  1970  MRN: [id number]  Physician: Ashlyn Meraz PA-C                                     Insurance: [facility]  Medical Diagnosis: bilateral LBP without sciatica                  Rehab Codes: M54.5, M79.1; M62.830; M79.605; R29.3; M40.46; R29.898  Onset Date: 10/20/18               Next 's appt. :   Visit# / total visits:    Cancels/No Shows:     Subjective:    Pain:  [] Yes  [x] No  Location: LBP   Pain Rating: (0-10 scale) 3/10  Pain altered Tx:  [] No  [x] Yes  Action: Began with stretches    Comments: Pt arrives noting he has some soreness in LB and into right LE. Pt states this is the first real day of work in the last few days. 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      Objective:  Not today: Type I thoracic at T5-6 and T1-4; FRSL at T2; snags to upper thoracic; L downslip correction;  Modalities:   Precautions: severe pes planus with previous surgery; poor ankle movement  Exercises: bolded  Exercise Reps/ Time Weight/ Level Comments   Scifit 10' ML3           SUPINE          Supine B Hip flexor stretch 3x ea 30\" Leg off the table   R leg pull X     Posterior pelvic tilts 10x 10\"    Lstab marches  15x ea 2# Added weight 18   SLR 15x ea 2# Added weight 18   SKTC 5x ea 5\"    LTR 5x ea 5\"    HS stretch 3x ea 30\"          SIERRA      Sierra rotary torso 20 ea 5 pl    Hip flexion 20 4pl Increased 18   Pulleys: B shoulder

## 2018-12-13 ENCOUNTER — HOSPITAL ENCOUNTER (OUTPATIENT)
Dept: PHYSICAL THERAPY | Facility: CLINIC | Age: 48
Setting detail: THERAPIES SERIES
Discharge: HOME | End: 2018-12-13
Payer: COMMERCIAL

## 2018-12-13 NOTE — FLOWSHEET NOTE
[] [address]  [address]    [phone]  F: [phone] [x] [address]  [facility]   [address]  P: [phone]  F: [phone] [] [place]  [address] [facility]  P: [phone]  F: [phone] [] [address]  [address] [facility] [address]   [address]   [place]: [phone]  F: [phone] [] [place] [address] [facility] [address]  [place]: [phone]   F: [phone]      Physical Therapy Cancel/No Show note    Date: 2018  Patient: Supriya Ibrahim Sr  : 1970  MRN: [id number]    Cancels/No Shows to date:     For today's appointment patient:  [x]  Cancelled  []  Rescheduled appointment  []  No-show     Reason given by patient:  []  Patient ill  []  Conflicting appointment  []  No transportation    [x]  Conflict with work  []  No reason given  []  Weather related  []  Other:      Comments:      [x]  Next appointment was confirmed    Electronically signed by: Orlin Hurley PTA

## 2018-12-17 ENCOUNTER — HOSPITAL ENCOUNTER (OUTPATIENT)
Dept: PHYSICAL THERAPY | Facility: CLINIC | Age: 48
Setting detail: THERAPIES SERIES
Discharge: HOME | End: 2018-12-17
Payer: COMMERCIAL

## 2018-12-17 PROCEDURE — 97110 THERAPEUTIC EXERCISES: CPT

## 2018-12-17 NOTE — FLOWSHEET NOTE
[] [facility]       Outpatient Physical        Therapy       [address]       Phone: [phone]       Fax: [phone] [x] [facility] at [address] [facility]       Phone: [phone]       Fax: [phone] [] [place] [address] [place] [address] [facility]  [address] [place] [address]   Phone: [phone]   Fax:  [phone]     Physical Therapy Daily Treatment Note    Date:  2018  Patient Name:  Kolton Nurse    :  1970  MRN: [id number]  Physician: Pratibha Victor PA-C                                     Insurance: [facility]  Medical Diagnosis: bilateral LBP without sciatica                  Rehab Codes: M54.5, M79.1; M62.830; M79.605; R29.3; M40.46; R29.898  Onset Date: 10/20/18               Next 's appt. :   Visit# / total visits:    Cancels/No Shows:     Subjective:    Pain:  [] Yes  [x] No  Location: LBP   Pain Rating: (0-10 scale) 0/10  Pain altered Tx:  [] No  [x] Yes  Action: Began with stretches    Comments: Pt denies any pain at this time      Objective:  Not today: Type I thoracic at T5-6 and T1-4; FRSL at T2; snags to upper thoracic; L downslip correction;  Modalities:   Precautions: severe pes planus with previous surgery; poor ankle movement  Exercises: bolded  Exercise Reps/ Time Weight/ Level Comments   Scifit 10' ML3           SUPINE          Supine B Hip flexor stretch 3x ea 30\" Leg off the table   R leg pull X     Posterior pelvic tilts 10x 10\"    Lstab marches  15x ea 2# Added weight 18   SLR 15x ea 2# Added weight 18   SKTC 5x ea 5\"    LTR 5x ea 5\"    HS stretch 3x ea 30\"          SIERRA      Sierra rotary torso 20 ea 5 pl    Hip flexion 20 4pl Increased 18   Pulleys: B shoulder adduction 20 3 With ta contraction; started 18   Pulleys: arm flex/ext with core contraction 20 Ext:2 flex: 2 Started 18   Spine extension 20x2 6pl Increased 18         Seated spasms and LE pain  4. Excellent core strength for improved posture and decreased spasms/pain     Patient goals:  \"find a way to stop the demobilization\"    Pt. Education:  [] Yes  [x] No  [] Reviewed Prior HEP/Ed  Method of Education: [] Verbal  [] Demo: clinic progression  [] Written  Comprehension of Education:  [] Verbalizes understanding. [] Demonstrates understanding. [] Needs review. [] Demonstrates/verbalizes HEP/Ed previously given. Plan: [x] Continue per plan of care.    [] Other:      Time In: 5:40pm              Time Out: 6:50pm     Electronically signed by:  Katherin Stern PTA

## 2018-12-20 ENCOUNTER — HOSPITAL ENCOUNTER (OUTPATIENT)
Dept: PHYSICAL THERAPY | Facility: CLINIC | Age: 48
Setting detail: THERAPIES SERIES
Discharge: HOME | End: 2018-12-20
Payer: COMMERCIAL

## 2018-12-26 ENCOUNTER — HOSPITAL ENCOUNTER (OUTPATIENT)
Dept: PHYSICAL THERAPY | Facility: CLINIC | Age: 48
Setting detail: THERAPIES SERIES
Discharge: HOME | End: 2018-12-26
Payer: COMMERCIAL

## 2018-12-26 NOTE — FLOWSHEET NOTE
[] [address]  [address]    [phone]  F: [phone] [x] [address]  [facility]   [address]  P: [phone]  F: [phone] [] [place]  [address]  P: [phone]  F: [phone] [] [address] [facility]   [address]   [facility]: [phone]  F: [phone] [] [facility]  [address]  [facility]: [phone]   F: [phone]      Physical Therapy Cancel/No Show note    Date: 2018  Patient: Marilee Leventhal Sr  : 1970  MRN: [id number]    Cancels/No Shows to date:     For today's appointment patient:  [x]  Cancelled  and 18  []  Rescheduled appointment  []  No-show     Reason given by patient:  []  Patient ill  []  Conflicting appointment  []  No transportation    []  Conflict with work  []  No reason given  []  Weather related  [x]  Other: death in family     Comments:      [x]  Pt to contact us when ready to return to therapy    Electronically signed by: Malina Pradhan, PT

## 2018-12-27 ENCOUNTER — APPOINTMENT (OUTPATIENT)
Dept: PHYSICAL THERAPY | Facility: CLINIC | Age: 48
End: 2018-12-27
Payer: COMMERCIAL

## 2019-01-18 ENCOUNTER — OFFICE VISIT (OUTPATIENT)
Dept: PRIMARY CARE CLINIC | Age: 49
End: 2019-01-18
Payer: COMMERCIAL

## 2019-01-18 VITALS
SYSTOLIC BLOOD PRESSURE: 122 MMHG | DIASTOLIC BLOOD PRESSURE: 69 MMHG | HEIGHT: 72 IN | BODY MASS INDEX: 32.51 KG/M2 | OXYGEN SATURATION: 97 % | RESPIRATION RATE: 20 BRPM | WEIGHT: 240 LBS | HEART RATE: 67 BPM | TEMPERATURE: 97.2 F

## 2019-01-18 DIAGNOSIS — E78.2 MIXED HYPERLIPIDEMIA: Primary | ICD-10-CM

## 2019-01-18 DIAGNOSIS — E66.09 CLASS 1 OBESITY DUE TO EXCESS CALORIES WITHOUT SERIOUS COMORBIDITY WITH BODY MASS INDEX (BMI) OF 32.0 TO 32.9 IN ADULT: ICD-10-CM

## 2019-01-18 DIAGNOSIS — M54.41 ACUTE BILATERAL LOW BACK PAIN WITH RIGHT-SIDED SCIATICA: ICD-10-CM

## 2019-01-18 PROCEDURE — G8417 CALC BMI ABV UP PARAM F/U: HCPCS | Performed by: PHYSICIAN ASSISTANT

## 2019-01-18 PROCEDURE — 1036F TOBACCO NON-USER: CPT | Performed by: PHYSICIAN ASSISTANT

## 2019-01-18 PROCEDURE — G8484 FLU IMMUNIZE NO ADMIN: HCPCS | Performed by: PHYSICIAN ASSISTANT

## 2019-01-18 PROCEDURE — 99214 OFFICE O/P EST MOD 30 MIN: CPT | Performed by: PHYSICIAN ASSISTANT

## 2019-01-18 PROCEDURE — G8427 DOCREV CUR MEDS BY ELIG CLIN: HCPCS | Performed by: PHYSICIAN ASSISTANT

## 2019-01-18 RX ORDER — ATORVASTATIN CALCIUM 40 MG/1
40 TABLET, FILM COATED ORAL NIGHTLY
Qty: 30 TABLET | Refills: 4 | Status: SHIPPED | OUTPATIENT
Start: 2019-01-18 | End: 2019-06-28 | Stop reason: SDUPTHER

## 2019-01-18 ASSESSMENT — ENCOUNTER SYMPTOMS
BACK PAIN: 1
COUGH: 1
WHEEZING: 1

## 2019-01-20 ASSESSMENT — ENCOUNTER SYMPTOMS
DIARRHEA: 0
CONSTIPATION: 0
SHORTNESS OF BREATH: 0
VOMITING: 0
NAUSEA: 0

## 2019-01-23 ENCOUNTER — HOSPITAL ENCOUNTER (OUTPATIENT)
Dept: PHYSICAL THERAPY | Facility: CLINIC | Age: 49
Setting detail: THERAPIES SERIES
Discharge: HOME | End: 2019-01-23
Payer: COMMERCIAL

## 2019-01-23 NOTE — DISCHARGE SUMMARY
[] [facility]) [facility] &  Therapy  [address]  [phone]  F: [phone] [x] [address]  [facility]   [address]  P: [phone]  F: [phone] [] [place]  [address]  P: [phone]  F: [phone] [] [address]  [facility]   [address]   [place]: [phone]  F: [phone]     Physical Therapy Discharge Note    Date: 2019      Patient: Giovanny Contreras Sr  : 1970  MRN: [id number]    Physician: John London PA-C                                     Insurance: [facility]  Medical Diagnosis: bilateral LBP without sciatica                  Rehab Codes: M54.5, M79.1; M62.830; M79.605; R29.3; M40.46; R29.898  Onset Date: 10/20/18               Next 's appt. :   Visit# / total visits:                                   Cancels/No Shows: 3/2    Date of initial visit: 18                Date of final visit: 18      Subjective:  Pain:  [] Yes  [x] No                      Location: LBP                          Pain Rating: (0-10 scale) 0/10  Pain altered Tx:  [] No  [x] Yes  Action: Began with stretches     Comments: Pt denies any pain at this time          Assessment: Pt was progressing well with strengthening and core stability exercises; pt would have times of increased pain related to activity level at work that was strenuous.     STG: (to be met in 6 treatments)  1. ? Pain: below 2-3/10 pain with infrequent LE symptoms and less reliance on medications: MET 18  2. ? ROM: tolerate hip flexor stretches, general lumbar stretches to help decrease # of spasms pt experiences and to decrease stress to lumbar spine so that he is able to lie in bed without fear of spasm MET   3. ? Strength: tolerate aggressive spinal stabilization program to address

## 2019-06-28 ENCOUNTER — OFFICE VISIT (OUTPATIENT)
Dept: PRIMARY CARE CLINIC | Age: 49
End: 2019-06-28
Payer: COMMERCIAL

## 2019-06-28 VITALS
TEMPERATURE: 98.2 F | BODY MASS INDEX: 29.93 KG/M2 | HEART RATE: 54 BPM | HEIGHT: 72 IN | OXYGEN SATURATION: 98 % | RESPIRATION RATE: 20 BRPM | DIASTOLIC BLOOD PRESSURE: 73 MMHG | SYSTOLIC BLOOD PRESSURE: 113 MMHG | WEIGHT: 221 LBS

## 2019-06-28 DIAGNOSIS — E66.3 OVERWEIGHT (BMI 25.0-29.9): ICD-10-CM

## 2019-06-28 DIAGNOSIS — E11.65 TYPE 2 DIABETES MELLITUS WITH HYPERGLYCEMIA, UNSPECIFIED WHETHER LONG TERM INSULIN USE (HCC): Primary | ICD-10-CM

## 2019-06-28 DIAGNOSIS — E78.2 MIXED HYPERLIPIDEMIA: ICD-10-CM

## 2019-06-28 LAB — HBA1C MFR BLD: 5.7 %

## 2019-06-28 PROCEDURE — 1036F TOBACCO NON-USER: CPT | Performed by: PHYSICIAN ASSISTANT

## 2019-06-28 PROCEDURE — G8427 DOCREV CUR MEDS BY ELIG CLIN: HCPCS | Performed by: PHYSICIAN ASSISTANT

## 2019-06-28 PROCEDURE — G8417 CALC BMI ABV UP PARAM F/U: HCPCS | Performed by: PHYSICIAN ASSISTANT

## 2019-06-28 PROCEDURE — 99214 OFFICE O/P EST MOD 30 MIN: CPT | Performed by: PHYSICIAN ASSISTANT

## 2019-06-28 PROCEDURE — 83036 HEMOGLOBIN GLYCOSYLATED A1C: CPT | Performed by: PHYSICIAN ASSISTANT

## 2019-06-28 PROCEDURE — 2022F DILAT RTA XM EVC RTNOPTHY: CPT | Performed by: PHYSICIAN ASSISTANT

## 2019-06-28 PROCEDURE — 3044F HG A1C LEVEL LT 7.0%: CPT | Performed by: PHYSICIAN ASSISTANT

## 2019-06-28 RX ORDER — ATORVASTATIN CALCIUM 40 MG/1
40 TABLET, FILM COATED ORAL NIGHTLY
Qty: 30 TABLET | Refills: 5 | Status: SHIPPED | OUTPATIENT
Start: 2019-06-28

## 2019-06-28 ASSESSMENT — PATIENT HEALTH QUESTIONNAIRE - PHQ9
2. FEELING DOWN, DEPRESSED OR HOPELESS: 0
SUM OF ALL RESPONSES TO PHQ QUESTIONS 1-9: 0
1. LITTLE INTEREST OR PLEASURE IN DOING THINGS: 0
SUM OF ALL RESPONSES TO PHQ9 QUESTIONS 1 & 2: 0
SUM OF ALL RESPONSES TO PHQ QUESTIONS 1-9: 0

## 2019-06-28 ASSESSMENT — ENCOUNTER SYMPTOMS: COUGH: 1

## 2019-06-28 NOTE — PROGRESS NOTES
1989       No Known Allergies      Current Outpatient Medications   Medication Sig Dispense Refill    atorvastatin (LIPITOR) 40 MG tablet Take 1 tablet by mouth nightly 30 tablet 5    ibuprofen (ADVIL;MOTRIN) 800 MG tablet Take 1 tablet by mouth every 6 hours as needed for Pain 120 tablet 0    tiZANidine (ZANAFLEX) 4 MG tablet Take 1 tablet by mouth 2 times daily as needed (Myalgia) 60 tablet 0    omeprazole (PRILOSEC) 20 MG delayed release capsule Take 1 capsule by mouth daily 30 capsule 1     No current facility-administered medications for this visit. Social History     Tobacco Use    Smoking status: Former Smoker     Years: 30.00     Types: Cigars     Last attempt to quit: 2017     Years since quittin.5    Smokeless tobacco: Never Used    Tobacco comment: \"on and off\"   Substance Use Topics    Alcohol use: Yes     Alcohol/week: 1.2 oz     Types: 2 Cans of beer per week     Comment: SOCIAL    Drug use: Not on file       No family history on file. REVIEW OFSYSTEMS:  Review of Systems   Constitutional: Negative for chills and fever. HENT: Negative for congestion. Respiratory: Positive for cough. Negative for shortness of breath and wheezing. Cardiovascular: Negative for chest pain. Gastrointestinal: Negative for constipation, diarrhea, nausea and vomiting. Genitourinary: Negative for dysuria, frequency and urgency. Musculoskeletal: Negative for back pain, myalgias and neck pain. Neurological: Negative for headaches. PHYSICAL EXAM:  Vitals:    19 0953   BP: 113/73   Site: Right Upper Arm   Position: Sitting   Cuff Size: Medium Adult   Pulse: 54   Resp: 20   Temp: 98.2 °F (36.8 °C)   TempSrc: Oral   SpO2: 98%   Weight: 221 lb (100.2 kg)   Height: 6' (1.829 m)     BP Readings from Last 3 Encounters:   19 113/73   19 122/69   10/26/18 123/67        Physical Exam   Constitutional: He is oriented to person, place, and time.  Vital signs are normal. He appears well-developed and well-nourished. He is cooperative. HENT:   Head: Normocephalic and atraumatic. Right Ear: External ear normal.   Left Ear: External ear normal.   Nose: Nose normal.   Eyes: Pupils are equal, round, and reactive to light. Conjunctivae and lids are normal.   Cardiovascular: Normal rate, regular rhythm and normal heart sounds. Pulmonary/Chest: Effort normal and breath sounds normal.   Abdominal: Soft. He exhibits no mass. There is no tenderness. Musculoskeletal: He exhibits no tenderness. Neurological: He is alert and oriented to person, place, and time. Skin: Skin is warm and dry. Vitals reviewed. DIAGNOSTIC FINDINGS:  CBC:  Lab Results   Component Value Date    WBC 5.99 01/31/2018    HGB 14.9 01/31/2018     01/31/2018       BMP:    Lab Results   Component Value Date     01/31/2018    K 4.5 01/31/2018     01/31/2018    CO2 28 01/31/2018    BUN 13 01/31/2018    CREATININE 0.86 01/31/2018    GLUCOSE 95 01/31/2018       HEMOGLOBIN A1C:   Lab Results   Component Value Date    LABA1C 5.7 06/28/2019       FASTING LIPID PANEL:  Lab Results   Component Value Date    CHOL 230 09/24/2018    HDL 50 09/24/2018    TRIG 87 09/24/2018       ASSESSMENT AND PLAN:  Hernesto AGUILAR was seen today for hyperlipidemia and back pain. Diagnoses and all orders for this visit:    Type 2 diabetes mellitus with hyperglycemia, unspecified whether long term insulin use (HCC)  -     POCT glycosylated hemoglobin (Hb A1C)  -     Comprehensive Metabolic Panel; Future  -     Microalbumin / Creatinine Urine Ratio; Future    Mixed hyperlipidemia  -     atorvastatin (LIPITOR) 40 MG tablet; Take 1 tablet by mouth nightly  -     CBC; Future  -     Lipid Panel; Future    Overweight (BMI 25.0-29. 9)      FOLLOW UP AND INSTRUCTIONS:  Return in about 5 months (around 11/28/2019) for Physical, Lab Review, DM Foot. · Discussed use, benefit, and side effects of prescribed medications.   Barriers to

## 2019-07-06 ASSESSMENT — ENCOUNTER SYMPTOMS
CONSTIPATION: 0
DIARRHEA: 0
NAUSEA: 0
WHEEZING: 0
VOMITING: 0
BACK PAIN: 0
SHORTNESS OF BREATH: 0

## 2019-12-05 ENCOUNTER — OFFICE VISIT (OUTPATIENT)
Dept: PRIMARY CARE CLINIC | Age: 49
End: 2019-12-05
Payer: COMMERCIAL

## 2019-12-05 VITALS
SYSTOLIC BLOOD PRESSURE: 133 MMHG | DIASTOLIC BLOOD PRESSURE: 84 MMHG | WEIGHT: 221.8 LBS | TEMPERATURE: 97.3 F | BODY MASS INDEX: 30.08 KG/M2 | HEART RATE: 61 BPM

## 2019-12-05 DIAGNOSIS — E66.09 CLASS 1 OBESITY DUE TO EXCESS CALORIES WITHOUT SERIOUS COMORBIDITY WITH BODY MASS INDEX (BMI) OF 30.0 TO 30.9 IN ADULT: Primary | ICD-10-CM

## 2019-12-05 DIAGNOSIS — Z00.00 ANNUAL PHYSICAL EXAM: ICD-10-CM

## 2019-12-05 PROCEDURE — G8417 CALC BMI ABV UP PARAM F/U: HCPCS | Performed by: PHYSICIAN ASSISTANT

## 2019-12-05 PROCEDURE — G8484 FLU IMMUNIZE NO ADMIN: HCPCS | Performed by: PHYSICIAN ASSISTANT

## 2019-12-05 PROCEDURE — 1036F TOBACCO NON-USER: CPT | Performed by: PHYSICIAN ASSISTANT

## 2019-12-05 PROCEDURE — 99214 OFFICE O/P EST MOD 30 MIN: CPT | Performed by: PHYSICIAN ASSISTANT

## 2019-12-05 PROCEDURE — G8427 DOCREV CUR MEDS BY ELIG CLIN: HCPCS | Performed by: PHYSICIAN ASSISTANT

## 2019-12-05 ASSESSMENT — ENCOUNTER SYMPTOMS: BACK PAIN: 1

## 2019-12-11 ASSESSMENT — ENCOUNTER SYMPTOMS
DIARRHEA: 0
SHORTNESS OF BREATH: 0
RHINORRHEA: 0
WHEEZING: 0
SORE THROAT: 0
NAUSEA: 0
ABDOMINAL PAIN: 0
VOMITING: 0
COUGH: 0
CONSTIPATION: 0
EYE PAIN: 0

## 2024-01-30 ENCOUNTER — HOSPITAL ENCOUNTER (OUTPATIENT)
Age: 54
Setting detail: SPECIMEN
Discharge: HOME | End: 2024-01-30

## 2024-01-30 ENCOUNTER — OFFICE VISIT (OUTPATIENT)
Dept: FAMILY MEDICINE CLINIC | Age: 54
End: 2024-01-30
Payer: COMMERCIAL

## 2024-01-30 VITALS
TEMPERATURE: 97 F | DIASTOLIC BLOOD PRESSURE: 78 MMHG | BODY MASS INDEX: 29.53 KG/M2 | SYSTOLIC BLOOD PRESSURE: 118 MMHG | WEIGHT: 218 LBS | HEIGHT: 72 IN | HEART RATE: 67 BPM | OXYGEN SATURATION: 96 %

## 2024-01-30 DIAGNOSIS — R73.03 PREDIABETES: ICD-10-CM

## 2024-01-30 DIAGNOSIS — E78.2 MIXED HYPERLIPIDEMIA: ICD-10-CM

## 2024-01-30 DIAGNOSIS — Z76.89 ENCOUNTER TO ESTABLISH CARE: Primary | ICD-10-CM

## 2024-01-30 DIAGNOSIS — Z12.11 SCREEN FOR COLON CANCER: ICD-10-CM

## 2024-01-30 DIAGNOSIS — Z12.5 SCREENING FOR PROSTATE CANCER: ICD-10-CM

## 2024-01-30 DIAGNOSIS — Z23 NEED FOR TDAP VACCINATION: ICD-10-CM

## 2024-01-30 DIAGNOSIS — G47.33 OBSTRUCTIVE SLEEP APNEA SYNDROME: ICD-10-CM

## 2024-01-30 DIAGNOSIS — R53.83 FATIGUE, UNSPECIFIED TYPE: ICD-10-CM

## 2024-01-30 LAB
25(OH)D3 SERPL-MCNC: 12.7 NG/ML
ALBUMIN SERPL-MCNC: 4.1 G/DL (ref 3.5–5.2)
ALBUMIN/GLOB SERPL: 1.2 {RATIO} (ref 1–2.5)
ALP SERPL-CCNC: 95 U/L (ref 40–129)
ALT SERPL-CCNC: 21 U/L (ref 5–41)
ANION GAP SERPL CALCULATED.3IONS-SCNC: 13 MMOL/L (ref 9–17)
AST SERPL-CCNC: 16 U/L
BASOPHILS # BLD: 0.05 K/UL (ref 0–0.2)
BASOPHILS NFR BLD: 1 % (ref 0–2)
BILIRUB SERPL-MCNC: 0.7 MG/DL (ref 0.3–1.2)
BUN SERPL-MCNC: 11 MG/DL (ref 6–20)
CALCIUM SERPL-MCNC: 8.9 MG/DL (ref 8.6–10.4)
CHLORIDE SERPL-SCNC: 103 MMOL/L (ref 98–107)
CHOLEST SERPL-MCNC: 197 MG/DL
CHOLESTEROL/HDL RATIO: 3.9
CO2 SERPL-SCNC: 22 MMOL/L (ref 20–31)
CREAT SERPL-MCNC: 0.9 MG/DL (ref 0.7–1.2)
EOSINOPHIL # BLD: 0.12 K/UL (ref 0–0.44)
EOSINOPHILS RELATIVE PERCENT: 2 % (ref 1–4)
ERYTHROCYTE [DISTWIDTH] IN BLOOD BY AUTOMATED COUNT: 16.5 % (ref 11.8–14.4)
GFR SERPL CREATININE-BSD FRML MDRD: >60 ML/MIN/1.73M2
GLUCOSE P FAST SERPL-MCNC: 98 MG/DL (ref 70–99)
HBA1C MFR BLD: 5.9 %
HCT VFR BLD AUTO: 49.4 % (ref 40.7–50.3)
HDLC SERPL-MCNC: 51 MG/DL
HGB BLD-MCNC: 15.8 G/DL (ref 13–17)
IMM GRANULOCYTES # BLD AUTO: <0.03 K/UL (ref 0–0.3)
IMM GRANULOCYTES NFR BLD: 0 %
LDLC SERPL CALC-MCNC: 133 MG/DL (ref 0–130)
LYMPHOCYTES NFR BLD: 1.35 K/UL (ref 1.1–3.7)
LYMPHOCYTES RELATIVE PERCENT: 19 % (ref 24–43)
MCH RBC QN AUTO: 26.1 PG (ref 25.2–33.5)
MCHC RBC AUTO-ENTMCNC: 32 G/DL (ref 28.4–34.8)
MCV RBC AUTO: 81.5 FL (ref 82.6–102.9)
MONOCYTES NFR BLD: 0.51 K/UL (ref 0.1–1.2)
MONOCYTES NFR BLD: 7 % (ref 3–12)
NEUTROPHILS NFR BLD: 71 % (ref 36–65)
NEUTS SEG NFR BLD: 4.9 K/UL (ref 1.5–8.1)
NRBC BLD-RTO: 0 PER 100 WBC
PLATELET # BLD AUTO: 212 K/UL (ref 138–453)
PMV BLD AUTO: 12.3 FL (ref 8.1–13.5)
POTASSIUM SERPL-SCNC: 4.3 MMOL/L (ref 3.7–5.3)
PROT SERPL-MCNC: 7.4 G/DL (ref 6.4–8.3)
PSA SERPL-MCNC: 0.59 NG/ML
RBC # BLD AUTO: 6.06 M/UL (ref 4.21–5.77)
RBC # BLD: ABNORMAL 10*6/UL
SODIUM SERPL-SCNC: 138 MMOL/L (ref 135–144)
TRIGL SERPL-MCNC: 63 MG/DL
TSH SERPL DL<=0.05 MIU/L-ACNC: 0.86 UIU/ML (ref 0.3–5)
VIT B12 SERPL-MCNC: 296 PG/ML (ref 232–1245)
WBC OTHER # BLD: 7 K/UL (ref 3.5–11.3)

## 2024-01-30 PROCEDURE — G8427 DOCREV CUR MEDS BY ELIG CLIN: HCPCS | Performed by: STUDENT IN AN ORGANIZED HEALTH CARE EDUCATION/TRAINING PROGRAM

## 2024-01-30 PROCEDURE — G8484 FLU IMMUNIZE NO ADMIN: HCPCS | Performed by: STUDENT IN AN ORGANIZED HEALTH CARE EDUCATION/TRAINING PROGRAM

## 2024-01-30 PROCEDURE — 90471 IMMUNIZATION ADMIN: CPT | Performed by: STUDENT IN AN ORGANIZED HEALTH CARE EDUCATION/TRAINING PROGRAM

## 2024-01-30 PROCEDURE — 3017F COLORECTAL CA SCREEN DOC REV: CPT | Performed by: STUDENT IN AN ORGANIZED HEALTH CARE EDUCATION/TRAINING PROGRAM

## 2024-01-30 PROCEDURE — 99204 OFFICE O/P NEW MOD 45 MIN: CPT | Performed by: STUDENT IN AN ORGANIZED HEALTH CARE EDUCATION/TRAINING PROGRAM

## 2024-01-30 PROCEDURE — 90715 TDAP VACCINE 7 YRS/> IM: CPT | Performed by: STUDENT IN AN ORGANIZED HEALTH CARE EDUCATION/TRAINING PROGRAM

## 2024-01-30 PROCEDURE — G8419 CALC BMI OUT NRM PARAM NOF/U: HCPCS | Performed by: STUDENT IN AN ORGANIZED HEALTH CARE EDUCATION/TRAINING PROGRAM

## 2024-01-30 PROCEDURE — 1036F TOBACCO NON-USER: CPT | Performed by: STUDENT IN AN ORGANIZED HEALTH CARE EDUCATION/TRAINING PROGRAM

## 2024-01-30 PROCEDURE — 83036 HEMOGLOBIN GLYCOSYLATED A1C: CPT | Performed by: STUDENT IN AN ORGANIZED HEALTH CARE EDUCATION/TRAINING PROGRAM

## 2024-01-30 SDOH — ECONOMIC STABILITY: INCOME INSECURITY: IN THE LAST 12 MONTHS, WAS THERE A TIME WHEN YOU WERE NOT ABLE TO PAY THE MORTGAGE OR RENT ON TIME?: NO

## 2024-01-30 SDOH — ECONOMIC STABILITY: FOOD INSECURITY: WITHIN THE PAST 12 MONTHS, YOU WORRIED THAT YOUR FOOD WOULD RUN OUT BEFORE YOU GOT MONEY TO BUY MORE.: NEVER TRUE

## 2024-01-30 SDOH — ECONOMIC STABILITY: TRANSPORTATION INSECURITY
IN THE PAST 12 MONTHS, HAS THE LACK OF TRANSPORTATION KEPT YOU FROM MEDICAL APPOINTMENTS OR FROM GETTING MEDICATIONS?: NO

## 2024-01-30 SDOH — ECONOMIC STABILITY: TRANSPORTATION INSECURITY
IN THE PAST 12 MONTHS, HAS LACK OF TRANSPORTATION KEPT YOU FROM MEETINGS, WORK, OR FROM GETTING THINGS NEEDED FOR DAILY LIVING?: NO

## 2024-01-30 SDOH — ECONOMIC STABILITY: HOUSING INSECURITY
IN THE LAST 12 MONTHS, WAS THERE A TIME WHEN YOU DID NOT HAVE A STEADY PLACE TO SLEEP OR SLEPT IN A SHELTER (INCLUDING NOW)?: NO

## 2024-01-30 SDOH — ECONOMIC STABILITY: FOOD INSECURITY: WITHIN THE PAST 12 MONTHS, THE FOOD YOU BOUGHT JUST DIDN'T LAST AND YOU DIDN'T HAVE MONEY TO GET MORE.: NEVER TRUE

## 2024-01-30 ASSESSMENT — PATIENT HEALTH QUESTIONNAIRE - PHQ9
1. LITTLE INTEREST OR PLEASURE IN DOING THINGS: 0
2. FEELING DOWN, DEPRESSED OR HOPELESS: 0
SUM OF ALL RESPONSES TO PHQ QUESTIONS 1-9: 0
SUM OF ALL RESPONSES TO PHQ9 QUESTIONS 1 & 2: 0
SUM OF ALL RESPONSES TO PHQ QUESTIONS 1-9: 0

## 2024-01-30 ASSESSMENT — SOCIAL DETERMINANTS OF HEALTH (SDOH): HOW HARD IS IT FOR YOU TO PAY FOR THE VERY BASICS LIKE FOOD, HOUSING, MEDICAL CARE, AND HEATING?: NOT HARD AT ALL

## 2024-01-30 NOTE — PROGRESS NOTES
Past Surgical History:   Procedure Laterality Date    COLONOSCOPY      FOOT SURGERY Left     FOOT SURGERY Right 6-25-15    gastroc nemius/ metatarsal coalition     HERNIA REPAIR Bilateral     INGUINAL HERNIA REPAIR       History reviewed. No pertinent family history.    Social History     Tobacco Use    Smoking status: Former     Types: Cigars     Quit date: 2017     Years since quittin.0    Smokeless tobacco: Never    Tobacco comments:     \"on and off\"   Substance Use Topics    Alcohol use: Yes     Alcohol/week: 2.0 standard drinks of alcohol     Types: 2 Cans of beer per week     Comment: SOCIAL      Current Outpatient Medications   Medication Sig Dispense Refill    Black Currant Seed Oil 500 MG CAPS Take by mouth       No current facility-administered medications for this visit.     No Known Allergies    Health Maintenance   Topic Date Due    Hepatitis B vaccine (1 of 3 - 3-dose series) Never done    COVID-19 Vaccine (1) Never done    HIV screen  Never done    Hepatitis C screen  Never done    Colorectal Cancer Screen  Never done    Shingles vaccine (1 of 2) Never done    Flu vaccine (1) Never done    A1C test (Diabetic or Prediabetic)  2025    Depression Screen  2025    Lipids  2029    DTaP/Tdap/Td vaccine (2 - Td or Tdap) 2034    Hepatitis A vaccine  Aged Out    Hib vaccine  Aged Out    Polio vaccine  Aged Out    Meningococcal (ACWY) vaccine  Aged Out    Pneumococcal 0-64 years Vaccine  Aged Out       Subjective:     Review of Systems   Constitutional:  Positive for fatigue. Negative for appetite change and fever.   HENT:  Negative for congestion, ear pain, hearing loss and sore throat.    Eyes:  Negative for discharge and visual disturbance.   Respiratory:  Negative for cough, chest tightness, shortness of breath and wheezing.    Cardiovascular:  Negative for chest pain, palpitations and leg swelling.   Gastrointestinal:  Negative for abdominal pain, constipation, diarrhea, nausea

## 2024-01-31 PROBLEM — R73.03 PREDIABETES: Status: ACTIVE | Noted: 2024-01-31

## 2024-01-31 ASSESSMENT — ENCOUNTER SYMPTOMS
SHORTNESS OF BREATH: 0
EYE DISCHARGE: 0
ABDOMINAL PAIN: 0
WHEEZING: 0
SORE THROAT: 0
DIARRHEA: 0
CONSTIPATION: 0
COUGH: 0
VOMITING: 0
CHEST TIGHTNESS: 0
NAUSEA: 0

## 2024-02-25 LAB — NONINV COLON CA DNA+OCC BLD SCRN STL QL: NEGATIVE

## 2024-07-29 ENCOUNTER — OFFICE VISIT (OUTPATIENT)
Dept: FAMILY MEDICINE CLINIC | Age: 54
End: 2024-07-29
Payer: COMMERCIAL

## 2024-07-29 VITALS
SYSTOLIC BLOOD PRESSURE: 106 MMHG | HEART RATE: 68 BPM | OXYGEN SATURATION: 97 % | BODY MASS INDEX: 30.2 KG/M2 | HEIGHT: 72 IN | WEIGHT: 223 LBS | DIASTOLIC BLOOD PRESSURE: 68 MMHG

## 2024-07-29 DIAGNOSIS — E55.9 VITAMIN D DEFICIENCY: ICD-10-CM

## 2024-07-29 DIAGNOSIS — Z12.5 SCREENING FOR PROSTATE CANCER: ICD-10-CM

## 2024-07-29 DIAGNOSIS — E53.8 LOW SERUM VITAMIN B12: ICD-10-CM

## 2024-07-29 DIAGNOSIS — E78.2 MIXED HYPERLIPIDEMIA: Primary | ICD-10-CM

## 2024-07-29 DIAGNOSIS — R73.03 PREDIABETES: ICD-10-CM

## 2024-07-29 PROCEDURE — 99214 OFFICE O/P EST MOD 30 MIN: CPT | Performed by: STUDENT IN AN ORGANIZED HEALTH CARE EDUCATION/TRAINING PROGRAM

## 2024-07-29 PROCEDURE — 1036F TOBACCO NON-USER: CPT | Performed by: STUDENT IN AN ORGANIZED HEALTH CARE EDUCATION/TRAINING PROGRAM

## 2024-07-29 PROCEDURE — G8417 CALC BMI ABV UP PARAM F/U: HCPCS | Performed by: STUDENT IN AN ORGANIZED HEALTH CARE EDUCATION/TRAINING PROGRAM

## 2024-07-29 PROCEDURE — 3017F COLORECTAL CA SCREEN DOC REV: CPT | Performed by: STUDENT IN AN ORGANIZED HEALTH CARE EDUCATION/TRAINING PROGRAM

## 2024-07-29 PROCEDURE — G8427 DOCREV CUR MEDS BY ELIG CLIN: HCPCS | Performed by: STUDENT IN AN ORGANIZED HEALTH CARE EDUCATION/TRAINING PROGRAM

## 2024-07-29 RX ORDER — ERGOCALCIFEROL 1.25 MG/1
50000 CAPSULE ORAL WEEKLY
Qty: 12 CAPSULE | Refills: 1 | Status: SHIPPED | OUTPATIENT
Start: 2024-07-29

## 2024-07-29 ASSESSMENT — ENCOUNTER SYMPTOMS
CONSTIPATION: 0
CHEST TIGHTNESS: 0
EYE DISCHARGE: 0
ABDOMINAL PAIN: 0
SORE THROAT: 0
SHORTNESS OF BREATH: 0
VOMITING: 0
COUGH: 0
WHEEZING: 0
NAUSEA: 0
DIARRHEA: 0

## 2024-07-29 NOTE — PROGRESS NOTES
[facility]  [facility] PRIMARY CARE  [address] [place] [address] A  [facility] [address]  Dept: [phone]  Dept Fax: [phone]    Shreyas Escalante Sr is a 54 y.o. male who is a Established patient, who presents today for his medical conditions/complaints as noted below:  Chief Complaint   Patient presents with    Discuss Labs         HPI:     He is here today to follow-up on his labs.  His labs are normal except for low vitamin D levels and low normal B12 levels.  He also has prediabetes which has been diet controlled, his last A1c was 5.9.  He states that he took some over-the-counter vitamin D but did not think it helped him much with energy and he would like to try a higher dose.  He otherwise tries to stay physically active and eats healthy.  Denies any other issues today.          Hemoglobin A1C (%)   Date Value   01/30/2024 5.9   06/28/2019 5.7   09/21/2018 6.6             ( goal A1Cis < 7)   No components found for: \"LABMICR\"  No components found for: \"LDLCHOLESTEROL\", \"LDLCALC\"    (goal LDL is <100)   AST (U/L)   Date Value   01/30/2024 16     ALT (U/L)   Date Value   01/30/2024 21     BUN (mg/dL)   Date Value   01/30/2024 11     BP Readings from Last 3 Encounters:   07/29/24 106/68   01/30/24 118/78   12/05/19 133/84          (goal 120/80)    Past Medical History:   Diagnosis Date    Class 1 obesity due to excess calories without serious comorbidity with body mass index (BMI) of 32.0 to 32.9 in adult 08/03/2016    Hyperglycemia 09/14/2016    Mixed dyslipidemia 09/14/2016    Reflux     Tobacco use disorder 08/03/2016      Past Surgical History:   Procedure Laterality Date    COLONOSCOPY      FOOT SURGERY Left     FOOT SURGERY Right 6-25-15    gastroc nemius/ metatarsal coalition     HERNIA REPAIR Bilateral     INGUINAL HERNIA REPAIR       History reviewed. No pertinent family history.    Social History     Tobacco Use    Smoking status: Former     Current packs/day: 0.00     Average packs/day: 1

## 2024-09-09 DIAGNOSIS — E53.8 LOW SERUM VITAMIN B12: ICD-10-CM

## 2025-01-27 ENCOUNTER — HOSPITAL ENCOUNTER (OUTPATIENT)
Age: 55
Setting detail: SPECIMEN
Discharge: HOME | End: 2025-01-27

## 2025-01-27 ENCOUNTER — OFFICE VISIT (OUTPATIENT)
Dept: FAMILY MEDICINE CLINIC | Age: 55
End: 2025-01-27
Payer: COMMERCIAL

## 2025-01-27 VITALS
DIASTOLIC BLOOD PRESSURE: 68 MMHG | HEIGHT: 72 IN | WEIGHT: 236 LBS | HEART RATE: 72 BPM | TEMPERATURE: 97 F | BODY MASS INDEX: 31.97 KG/M2 | OXYGEN SATURATION: 97 % | SYSTOLIC BLOOD PRESSURE: 110 MMHG

## 2025-01-27 DIAGNOSIS — E78.2 MIXED HYPERLIPIDEMIA: ICD-10-CM

## 2025-01-27 DIAGNOSIS — E53.8 LOW SERUM VITAMIN B12: ICD-10-CM

## 2025-01-27 DIAGNOSIS — R73.03 PREDIABETES: ICD-10-CM

## 2025-01-27 DIAGNOSIS — Z12.5 SCREENING FOR PROSTATE CANCER: ICD-10-CM

## 2025-01-27 DIAGNOSIS — E78.2 MIXED HYPERLIPIDEMIA: Primary | ICD-10-CM

## 2025-01-27 DIAGNOSIS — E55.9 VITAMIN D DEFICIENCY: ICD-10-CM

## 2025-01-27 LAB
25(OH)D3 SERPL-MCNC: 16.8 NG/ML (ref 30–100)
ALBUMIN SERPL-MCNC: 4.2 G/DL (ref 3.5–5.2)
ALBUMIN/GLOB SERPL: 1.4 {RATIO} (ref 1–2.5)
ALP SERPL-CCNC: 82 U/L (ref 40–129)
ALT SERPL-CCNC: 23 U/L (ref 10–50)
ANION GAP SERPL CALCULATED.3IONS-SCNC: 8 MMOL/L (ref 9–16)
AST SERPL-CCNC: 19 U/L (ref 10–50)
BASOPHILS # BLD: 0.03 K/UL (ref 0–0.2)
BASOPHILS NFR BLD: 1 % (ref 0–2)
BILIRUB SERPL-MCNC: 0.6 MG/DL (ref 0–1.2)
BUN SERPL-MCNC: 9 MG/DL (ref 6–20)
CALCIUM SERPL-MCNC: 9.6 MG/DL (ref 8.6–10.4)
CHLORIDE SERPL-SCNC: 104 MMOL/L (ref 98–107)
CHOLEST SERPL-MCNC: 235 MG/DL (ref 0–199)
CHOLESTEROL/HDL RATIO: 4.7
CO2 SERPL-SCNC: 26 MMOL/L (ref 20–31)
CREAT SERPL-MCNC: 1 MG/DL (ref 0.7–1.2)
EOSINOPHIL # BLD: 0.2 K/UL (ref 0–0.44)
EOSINOPHILS RELATIVE PERCENT: 4 % (ref 1–4)
ERYTHROCYTE [DISTWIDTH] IN BLOOD BY AUTOMATED COUNT: 15.7 % (ref 11.8–14.4)
EST. AVERAGE GLUCOSE BLD GHB EST-MCNC: 123 MG/DL
GFR, ESTIMATED: 89 ML/MIN/1.73M2
GLUCOSE SERPL-MCNC: 94 MG/DL (ref 74–99)
HBA1C MFR BLD: 5.9 % (ref 4–6)
HCT VFR BLD AUTO: 49.7 % (ref 40.7–50.3)
HDLC SERPL-MCNC: 50 MG/DL
HGB BLD-MCNC: 15.2 G/DL (ref 13–17)
IMM GRANULOCYTES # BLD AUTO: <0.03 K/UL (ref 0–0.3)
IMM GRANULOCYTES NFR BLD: 0 %
LDLC SERPL CALC-MCNC: 167 MG/DL (ref 0–100)
LYMPHOCYTES NFR BLD: 1.35 K/UL (ref 1.1–3.7)
LYMPHOCYTES RELATIVE PERCENT: 24 % (ref 24–43)
MCH RBC QN AUTO: 25.3 PG (ref 25.2–33.5)
MCHC RBC AUTO-ENTMCNC: 30.6 G/DL (ref 28.4–34.8)
MCV RBC AUTO: 82.8 FL (ref 82.6–102.9)
MONOCYTES NFR BLD: 0.42 K/UL (ref 0.1–1.2)
MONOCYTES NFR BLD: 8 % (ref 3–12)
NEUTROPHILS NFR BLD: 63 % (ref 36–65)
NEUTS SEG NFR BLD: 3.54 K/UL (ref 1.5–8.1)
NRBC BLD-RTO: 0 PER 100 WBC
PLATELET # BLD AUTO: 217 K/UL (ref 138–453)
PMV BLD AUTO: 11.9 FL (ref 8.1–13.5)
POTASSIUM SERPL-SCNC: 4.7 MMOL/L (ref 3.7–5.3)
PROT SERPL-MCNC: 7.2 G/DL (ref 6.6–8.7)
PSA SERPL-MCNC: 0.57 NG/ML (ref 0–4)
RBC # BLD AUTO: 6 M/UL (ref 4.21–5.77)
RBC # BLD: ABNORMAL 10*6/UL
SODIUM SERPL-SCNC: 138 MMOL/L (ref 136–145)
TRIGL SERPL-MCNC: 91 MG/DL
TSH SERPL DL<=0.05 MIU/L-ACNC: 1.47 UIU/ML (ref 0.27–4.2)
VIT B12 SERPL-MCNC: 598 PG/ML (ref 232–1245)
VLDLC SERPL CALC-MCNC: 18 MG/DL (ref 1–30)
WBC OTHER # BLD: 5.6 K/UL (ref 3.5–11.3)

## 2025-01-27 PROCEDURE — 99214 OFFICE O/P EST MOD 30 MIN: CPT | Performed by: STUDENT IN AN ORGANIZED HEALTH CARE EDUCATION/TRAINING PROGRAM

## 2025-01-27 PROCEDURE — 1036F TOBACCO NON-USER: CPT | Performed by: STUDENT IN AN ORGANIZED HEALTH CARE EDUCATION/TRAINING PROGRAM

## 2025-01-27 PROCEDURE — G8427 DOCREV CUR MEDS BY ELIG CLIN: HCPCS | Performed by: STUDENT IN AN ORGANIZED HEALTH CARE EDUCATION/TRAINING PROGRAM

## 2025-01-27 PROCEDURE — 3017F COLORECTAL CA SCREEN DOC REV: CPT | Performed by: STUDENT IN AN ORGANIZED HEALTH CARE EDUCATION/TRAINING PROGRAM

## 2025-01-27 PROCEDURE — G8417 CALC BMI ABV UP PARAM F/U: HCPCS | Performed by: STUDENT IN AN ORGANIZED HEALTH CARE EDUCATION/TRAINING PROGRAM

## 2025-01-27 RX ORDER — ERGOCALCIFEROL 1.25 MG/1
50000 CAPSULE, LIQUID FILLED ORAL WEEKLY
Qty: 12 CAPSULE | Refills: 1 | Status: SHIPPED | OUTPATIENT
Start: 2025-01-27

## 2025-01-27 SDOH — ECONOMIC STABILITY: FOOD INSECURITY: WITHIN THE PAST 12 MONTHS, THE FOOD YOU BOUGHT JUST DIDN'T LAST AND YOU DIDN'T HAVE MONEY TO GET MORE.: NEVER TRUE

## 2025-01-27 SDOH — ECONOMIC STABILITY: FOOD INSECURITY: WITHIN THE PAST 12 MONTHS, YOU WORRIED THAT YOUR FOOD WOULD RUN OUT BEFORE YOU GOT MONEY TO BUY MORE.: NEVER TRUE

## 2025-01-27 SDOH — ECONOMIC STABILITY: INCOME INSECURITY: IN THE LAST 12 MONTHS, WAS THERE A TIME WHEN YOU WERE NOT ABLE TO PAY THE MORTGAGE OR RENT ON TIME?: NO

## 2025-01-27 ASSESSMENT — PATIENT HEALTH QUESTIONNAIRE - PHQ9
SUM OF ALL RESPONSES TO PHQ QUESTIONS 1-9: 0
2. FEELING DOWN, DEPRESSED OR HOPELESS: NOT AT ALL
SUM OF ALL RESPONSES TO PHQ9 QUESTIONS 1 & 2: 0
SUM OF ALL RESPONSES TO PHQ QUESTIONS 1-9: 0
1. LITTLE INTEREST OR PLEASURE IN DOING THINGS: NOT AT ALL
SUM OF ALL RESPONSES TO PHQ QUESTIONS 1-9: 0
SUM OF ALL RESPONSES TO PHQ QUESTIONS 1-9: 0

## 2025-01-27 ASSESSMENT — ENCOUNTER SYMPTOMS
EYE DISCHARGE: 0
CHEST TIGHTNESS: 0
CONSTIPATION: 0
COUGH: 0
ABDOMINAL PAIN: 0
SORE THROAT: 0
VOMITING: 0
NAUSEA: 0
DIARRHEA: 0
SHORTNESS OF BREATH: 0
WHEEZING: 0

## 2025-01-27 ASSESSMENT — SOCIAL DETERMINANTS OF HEALTH (SDOH): HOW HARD IS IT FOR YOU TO PAY FOR THE VERY BASICS LIKE FOOD, HOUSING, MEDICAL CARE, AND HEATING?: NOT HARD AT ALL

## 2025-01-27 NOTE — PROGRESS NOTES
tightness, shortness of breath and wheezing.    Cardiovascular:  Negative for chest pain, palpitations and leg swelling.   Gastrointestinal:  Negative for abdominal pain, constipation, diarrhea, nausea and vomiting.   Genitourinary:  Negative for flank pain, frequency, hematuria and urgency.   Musculoskeletal:  Negative for arthralgias, gait problem, joint swelling and myalgias.   Skin: Negative.    Neurological:  Negative for dizziness, weakness, numbness and headaches.   Psychiatric/Behavioral: Negative.  Negative for dysphoric mood. The patient is not nervous/anxious.        Objective:     Physical Exam  Vitals reviewed.   Constitutional:       Appearance: Normal appearance. He is normal weight.   HENT:      Head: Normocephalic and atraumatic.      Nose: Nose normal.      Mouth/Throat:      Mouth: Mucous membranes are moist.      Pharynx: Oropharynx is clear.   Eyes:      Extraocular Movements: Extraocular movements intact.      Conjunctiva/sclera: Conjunctivae normal.      Pupils: Pupils are equal, round, and reactive to light.   Cardiovascular:      Rate and Rhythm: Normal rate and regular rhythm.      Heart sounds: Normal heart sounds. No murmur heard.     No gallop.   Pulmonary:      Effort: Pulmonary effort is normal. No respiratory distress.      Breath sounds: Normal breath sounds. No stridor. No wheezing.   Abdominal:      General: Bowel sounds are normal. There is no distension.      Palpations: Abdomen is soft.      Tenderness: There is no abdominal tenderness. There is no guarding or rebound.   Musculoskeletal:         General: No swelling or tenderness. Normal range of motion.      Cervical back: Normal range of motion and neck supple.   Skin:     General: Skin is warm and dry.      Coloration: Skin is not jaundiced.      Findings: No rash.   Neurological:      General: No focal deficit present.      Mental Status: He is alert and oriented to person, place, and time.   Psychiatric:         Mood and

## 2025-07-15 ENCOUNTER — OFFICE VISIT (OUTPATIENT)
Dept: FAMILY MEDICINE CLINIC | Age: 55
End: 2025-07-15
Payer: COMMERCIAL

## 2025-07-15 ENCOUNTER — HOSPITAL ENCOUNTER (OUTPATIENT)
Age: 55
Setting detail: SPECIMEN
Discharge: HOME | End: 2025-07-15

## 2025-07-15 VITALS
DIASTOLIC BLOOD PRESSURE: 72 MMHG | HEIGHT: 72 IN | BODY MASS INDEX: 30.88 KG/M2 | SYSTOLIC BLOOD PRESSURE: 125 MMHG | HEART RATE: 64 BPM | WEIGHT: 228 LBS | OXYGEN SATURATION: 98 %

## 2025-07-15 DIAGNOSIS — R73.03 PREDIABETES: ICD-10-CM

## 2025-07-15 DIAGNOSIS — E78.2 MIXED HYPERLIPIDEMIA: ICD-10-CM

## 2025-07-15 DIAGNOSIS — L60.8 TOENAIL DEFORMITY: Primary | ICD-10-CM

## 2025-07-15 DIAGNOSIS — E55.9 VITAMIN D DEFICIENCY: ICD-10-CM

## 2025-07-15 LAB
25(OH)D3 SERPL-MCNC: 29.2 NG/ML (ref 30–100)
CHOLEST SERPL-MCNC: 207 MG/DL (ref 0–199)
CHOLESTEROL/HDL RATIO: 4.7
EST. AVERAGE GLUCOSE BLD GHB EST-MCNC: 120 MG/DL
HBA1C MFR BLD: 5.8 % (ref 4–6)
HDLC SERPL-MCNC: 44 MG/DL
LDLC SERPL CALC-MCNC: 146 MG/DL (ref 0–100)
TRIGL SERPL-MCNC: 85 MG/DL
VLDLC SERPL CALC-MCNC: 17 MG/DL (ref 1–30)

## 2025-07-15 PROCEDURE — 99214 OFFICE O/P EST MOD 30 MIN: CPT | Performed by: STUDENT IN AN ORGANIZED HEALTH CARE EDUCATION/TRAINING PROGRAM

## 2025-07-15 PROCEDURE — G8417 CALC BMI ABV UP PARAM F/U: HCPCS | Performed by: STUDENT IN AN ORGANIZED HEALTH CARE EDUCATION/TRAINING PROGRAM

## 2025-07-15 PROCEDURE — G8427 DOCREV CUR MEDS BY ELIG CLIN: HCPCS | Performed by: STUDENT IN AN ORGANIZED HEALTH CARE EDUCATION/TRAINING PROGRAM

## 2025-07-15 PROCEDURE — 1036F TOBACCO NON-USER: CPT | Performed by: STUDENT IN AN ORGANIZED HEALTH CARE EDUCATION/TRAINING PROGRAM

## 2025-07-15 PROCEDURE — 3017F COLORECTAL CA SCREEN DOC REV: CPT | Performed by: STUDENT IN AN ORGANIZED HEALTH CARE EDUCATION/TRAINING PROGRAM

## 2025-07-15 SDOH — ECONOMIC STABILITY: FOOD INSECURITY: WITHIN THE PAST 12 MONTHS, THE FOOD YOU BOUGHT JUST DIDN'T LAST AND YOU DIDN'T HAVE MONEY TO GET MORE.: NEVER TRUE

## 2025-07-15 SDOH — ECONOMIC STABILITY: FOOD INSECURITY: WITHIN THE PAST 12 MONTHS, YOU WORRIED THAT YOUR FOOD WOULD RUN OUT BEFORE YOU GOT MONEY TO BUY MORE.: NEVER TRUE

## 2025-07-15 ASSESSMENT — ENCOUNTER SYMPTOMS
ABDOMINAL PAIN: 0
SORE THROAT: 0
VOMITING: 0
SHORTNESS OF BREATH: 0
DIARRHEA: 0
COUGH: 0
WHEEZING: 0
NAUSEA: 0
EYE DISCHARGE: 0
CHEST TIGHTNESS: 0
CONSTIPATION: 0

## 2025-07-15 ASSESSMENT — PATIENT HEALTH QUESTIONNAIRE - PHQ9
1. LITTLE INTEREST OR PLEASURE IN DOING THINGS: NOT AT ALL
SUM OF ALL RESPONSES TO PHQ QUESTIONS 1-9: 0
2. FEELING DOWN, DEPRESSED OR HOPELESS: NOT AT ALL
SUM OF ALL RESPONSES TO PHQ QUESTIONS 1-9: 0

## 2025-07-15 NOTE — PROGRESS NOTES
[facility]  [facility] PRIMARY CARE  [address] [place] [address] JEFF  [facility] [address]  Dept: [phone]  Dept Fax: [phone]    Shreyas Escalante Sr is a 55 y.o. male who is a Established patient, who presents today for his medical conditions/complaints as noted below:  Chief Complaint   Patient presents with    Toe Pain     Both but mainly the right foot     Burning sensation - blisters         HPI:     HPI  History of Present Illness  The patient presents for evaluation of toe pain.    He has been experiencing pain in both toes, which he suspects may be due to his footwear. He wears size 13 shoes, but notes that some fit more snugly than others. He also reports the presence of blisters on his feet. He has a history of foot surgery, having undergone procedures on both feet approximately 10 years ago. Recently, he has resumed cycling. He has identified a few pairs of shoes that provide relief and has been wearing them frequently when not at work.    He has high cholesterol. There is no family history of heart disease. He reports no longer smoking heavily. He tries to fast every now and then, but he eats less than what he used to.    Social History:  Hobbies: Cycling  Diet: He tries to fast every now and then but eats less than what he used to.  Tobacco: He reports no longer smoking heavily.    PAST SURGICAL HISTORY:  Foot surgery on both feet approximately 10 years ago.    FAMILY HISTORY  He does not have any family history of heart disease.        Hemoglobin A1C (%)   Date Value   07/15/2025 5.8   01/27/2025 5.9   01/30/2024 5.9             ( goal A1Cis < 7)   No components found for: \"LABMICR\"  No components found for: \"LDLCHOLESTEROL\", \"LDLCALC\"    (goal LDL is <100)   AST (U/L)   Date Value   01/27/2025 19     ALT (U/L)   Date Value   01/27/2025 23     BUN (mg/dL)   Date Value   01/27/2025 9     BP Readings from Last 3 Encounters:   07/15/25 125/72   01/27/25 110/68   07/29/24 106/68          (goal